# Patient Record
Sex: MALE | Race: WHITE | NOT HISPANIC OR LATINO | Employment: OTHER | URBAN - METROPOLITAN AREA
[De-identification: names, ages, dates, MRNs, and addresses within clinical notes are randomized per-mention and may not be internally consistent; named-entity substitution may affect disease eponyms.]

---

## 2017-01-25 ENCOUNTER — PATIENT OUTREACH (OUTPATIENT)
Dept: ADMINISTRATIVE | Facility: HOSPITAL | Age: 77
End: 2017-01-25

## 2017-02-02 ENCOUNTER — PATIENT OUTREACH (OUTPATIENT)
Dept: ADMINISTRATIVE | Facility: HOSPITAL | Age: 77
End: 2017-02-02

## 2017-02-02 NOTE — LETTER
February 2, 2017    Jet Hernandez  [address] [facility] [address]             [facility]  [address]  [place] [address]  Phone: [phone] Dear Mr. Hernandez:    We have tried to reach you by mychart unsuccessfully.   [facility] is committed to your overall health.  To help you get the most out of each of your visits, we will review your information to make sure you are up to date on all of your recommended tests and/or procedures.       Dr. Sampson        has found that you may be due for:     Tetanus immunization   Shingles immunization   Pneumonia immunization     Please bring your home blood pressure monitor so that we can test for its accuracy    If you have had any of the above done at another facility, please bring the records or information with you so that your record at [facility] will be complete.      If you are currently taking medication , please bring it with you to your appointment for review.    Sincerely,    Marisela Tariq  Clinical Care Coordinator  [facility]  [address] [place] [address]  Phone: [phone]   Fax: [phone]

## 2017-02-08 ENCOUNTER — OFFICE VISIT (OUTPATIENT)
Dept: FAMILY MEDICINE | Facility: CLINIC | Age: 77
End: 2017-02-08
Payer: MEDICARE

## 2017-02-08 VITALS
OXYGEN SATURATION: 98 % | HEART RATE: 58 BPM | SYSTOLIC BLOOD PRESSURE: 164 MMHG | HEIGHT: 76 IN | WEIGHT: 211.63 LBS | DIASTOLIC BLOOD PRESSURE: 74 MMHG | BODY MASS INDEX: 25.77 KG/M2

## 2017-02-08 DIAGNOSIS — M47.816 ARTHRITIS OF LUMBAR SPINE: ICD-10-CM

## 2017-02-08 DIAGNOSIS — J44.9 CHRONIC OBSTRUCTIVE PULMONARY DISEASE, UNSPECIFIED COPD TYPE: Chronic | ICD-10-CM

## 2017-02-08 DIAGNOSIS — E78.00 PURE HYPERCHOLESTEROLEMIA: ICD-10-CM

## 2017-02-08 DIAGNOSIS — I10 ESSENTIAL HYPERTENSION: Primary | ICD-10-CM

## 2017-02-08 DIAGNOSIS — I25.10 CORONARY ARTERY DISEASE INVOLVING NATIVE CORONARY ARTERY OF NATIVE HEART WITHOUT ANGINA PECTORIS: ICD-10-CM

## 2017-02-08 DIAGNOSIS — K21.9 GASTROESOPHAGEAL REFLUX DISEASE WITHOUT ESOPHAGITIS: Chronic | ICD-10-CM

## 2017-02-08 DIAGNOSIS — Z86.010 HISTORY OF COLON POLYPS: Chronic | ICD-10-CM

## 2017-02-08 PROBLEM — Z86.0100 HISTORY OF COLON POLYPS: Chronic | Status: ACTIVE | Noted: 2017-02-08

## 2017-02-08 PROCEDURE — 99214 OFFICE O/P EST MOD 30 MIN: CPT | Mod: S$PBB,,, | Performed by: INTERNAL MEDICINE

## 2017-02-08 PROCEDURE — 99999 PR PBB SHADOW E&M-EST. PATIENT-LVL III: CPT | Mod: PBBFAC,,, | Performed by: INTERNAL MEDICINE

## 2017-02-08 PROCEDURE — 99213 OFFICE O/P EST LOW 20 MIN: CPT | Mod: PBBFAC,PO | Performed by: INTERNAL MEDICINE

## 2017-02-08 RX ORDER — HYDROGEN PEROXIDE 3 %
20 SOLUTION, NON-ORAL MISCELLANEOUS
COMMUNITY
End: 2022-11-03

## 2017-02-08 NOTE — MR AVS SNAPSHOT
[facility]  [address]  [place] [address]  Phone: [phone]  Fax: [phone]                  Jet Hernandez   2017 11:00 AM   Office Visit    Description:  Male : 1940   Provider:  Stephan Sampson MD   Department:  [facility]           Reason for Visit     Annual Exam           Diagnoses this Visit        Comments    Essential hypertension    -  Primary     Chronic obstructive pulmonary disease, unspecified COPD type         Pure hypercholesterolemia         Arthritis of lumbar spine         Gastroesophageal reflux disease without esophagitis         Coronary artery disease involving native coronary artery of native heart without angina pectoris         History of colon polyps                To Do List           Future Appointments        Provider Department Dept Phone    8/15/2017 11:00 AM Stephan Sampson MD [facility] [phone]      Goals (5 Years of Data)     None      Follow-Up and Disposition     Return in about 6 months (around 2017).      [facility] On Call     [facility] [place] [facility] Line - 24/ Assistance  Registered nurses in the [facility] provide clinical advisement, health education, appointment booking, and other advisory services.  Call for this free service at [phone].             Medications           Message regarding Medications     Verify the changes and/or additions to your medication regime listed below are the same as discussed with your clinician today.  If any of these changes or additions are incorrect, please notify your healthcare provider.        STOP taking these medications     lansoprazole (PREVACID) 15 MG capsule Take 15 mg by mouth once daily.            Verify that the below list of medications is an accurate representation of the medications you are currently taking.  If none reported, the list may be blank. If incorrect, please contact your healthcare  "provider. Carry this list with you in case of emergency.           Current Medications     aspirin 81 mg Tab Take by mouth once daily.     carvedilol (COREG) 12.5 MG tablet Take 12.5 mg by mouth 2 (two) times daily.     esomeprazole (NEXIUM) 20 MG capsule Take 20 mg by mouth before breakfast.    fluticasone (FLONASE) 50 mcg/actuation nasal spray USE TWO SPRAYS IN EACH NARE ONCE DAILY    hydrochlorothiazide (MICROZIDE) 12.5 mg capsule Take 12.5 mg by mouth once daily.     ramipril (ALTACE) 5 MG capsule Take 5 mg by mouth 2 (two) times daily.     simvastatin (ZOCOR) 40 MG tablet Take 40 mg by mouth every evening.     albuterol 90 mcg/actuation inhaler Inhale 2 puffs into the lungs every 6 (six) hours as needed for Wheezing.           Clinical Reference Information           Your Vitals Were     BP Pulse Height Weight SpO2 BMI    164/74 58 6' 4" (1.93 m) 96 kg (211 lb 10.3 oz) 98% 25.76 kg/m2      Blood Pressure          Most Recent Value    BP  (!)  164/74      Allergies as of 2/8/2017     No Known Drug Allergies      Immunizations Administered on Date of Encounter - 2/8/2017     None      Instructions    Reviewed recent lab,discussed  risk factors, and recommended to continue his wellness program, exercises, diet, meds, and  wt. Control. See dictation..b.       Language Assistance Services     ATTENTION: Language assistance services are available, free of charge. Please call [phone].      ATENCIÓN: Si habla lee, tiene a pink disposición servicios gratuitos de asistencia lingüística. Llame al [phone].     [facility] Ý: N?u b?n nói Ti?ng Vi?t, có các d?ch v? h? tr? ngôn ng? mi?n phí dành cho b?n. G?i s? [phone].         [facility] complies with applicable Federal civil rights laws and does not discriminate on the basis of race, color, national origin, age, disability, or sex.        "

## 2017-02-08 NOTE — PATIENT INSTRUCTIONS
Reviewed recent lab,discussed  risk factors, and recommended to continue his wellness program, exercises, diet, meds, and  wt. Control. See dictation..b.

## 2017-02-08 NOTE — PROGRESS NOTES
A 76-year-old white male who presents again today for counseling, discussion of   his medical problems and also review of his medical problems.    To document the patient in the past and we will continue in the future to have   and address his primary care concerns completely with Cardiology and their nurse   practitioner by choice.  However, today, would like to review his medical   problems and condition, especially his low back syndrome, which has been noted   on the past 2-3 months symptomatic.      He has had rather significant low back pain   history with surgery in the remote past, but no significant problems, but has   been overworked, stressed and possibly strained his back and has somewhat   persistent low back pain with slight radiculopathy noted on the right.  He has   on his own found with evaluation from both orthopedic and neurosurgical   evaluations by Dr. Ramires and Dr. Roberth Li, have rather significant low   back syndrome strain, possibly early radiculopathy and stenosis.  He has   underwent a steroid injection approximately 2-3 weeks ago with definite   significant relief since that time and even improvement in radiculopathy.  He is   extremely concerned regarding avoidance of future surgery and asked questions   regarding his condition.      We discussed his medical problems and condition at   present.  His pain, discomfort, limitation, fall risk and all appeared to be   relatively stable except for stiffness and pain and somewhat insecure regarding   his mobility.  Precautions given, if any doubt, use a cane or walker and   continue limited exercise and home PT and heat and possibly consider and   continue his physical therapy, which he entered by their recommendations and   consultation.  His course would be important over the next 3-6 months.  If he   has consistent pain, discomfort and limitation, particularly fall risk after his   injection, possibly a repeat injection may be  indicated, but if persistent   areas of symptoms above, would definitely consider a reconsultation with Dr. Roberth Li, who is very good and very conservative.      He has had   hyperlipidemia in the past and off and on lipid-lowering agents and diet, and   all lab again will be performed at [facility].  I asked for a   possible copy to review and have here at [facility].    Hypertension, essential, labile.  Past history of white coat syndrome.  Today on   arrival, 164/74, repeat in the left arm standing is 142/80.  Precautions to   continue to self monitor and maintain under 135 systolic and under 80 diastolic   mostly on a consistent basis.  No associated symptoms, etc.    He has been seen and evaluated by Dr. Jason Lott in the past and has had   coronary artery disease by history, but no unstable angina, CHF or arrhythmia   noted and now [facility] will continue with followup and evaluation as   indicated.    The patient has had a history of colonic polyps, possibly by history   nonmalignant or dysplastic, but now with Dr. Victor retired and no notification   given, possibly due at the present time would call their nurse for instructions   and recommendations.  No GI symptoms noted.  Appetite is good.  BMs are normal.    No melena, hematochezia, dysphagia or dyspepsia.    REVIEW OF SYSTEMS:  GENERAL:  Complete review of systems otherwise unremarkable with no evidence of   visual or auditory changes.  Recent eye examinations have been essentially   within normal range.  No fever, chills or sweats.  RESPIRATORY:  No wheeze, cough or shortness of breath.  HEART:  See HPI.  GASTROINTESTINAL:  Appetite good.  No diarrhea.  GENITOURINARY:  The patient has no dysuria or hematuria, but has had an elevated   PSA and the last 4.76 and no significant change or improvement with treatment   with Cipro.  Dr. Saucedo, his urologist will be following, repeating PSA in 6   months and reevaluating his  condition for possible CA.  I concurred with this   program and recommendation.    The patient's immunization reviewed, all up-to-date according to history.  He   has had his Tdap, 2 pneumonia vaccines, a flu shot recent and shingles vaccine   in the past.    PHYSICAL EXAMINATION:  GENERAL:  Reveals a well-developed, well-nourished white male.  VITAL SIGNS:  See vital signs and measurements.  VEINS:  Good peripheral pulsation.  Normal carotid upstroke and amplitude.    There was no edema or phlebitis noted.  NODES:  None felt in the neck, submandibular or supraclavicular region.  NEUROLOGICAL:  Oriented x3.  Relatively good motor tone and strength.  Cranial   nerves grossly intact II-XII.  No tremor noted and no evidence of gait   abnormality or fall risk with brief observation.  HEENT:  Pupils are equal and reactive to light and accommodation.  External eye   movements were intact.  No pharyngitis, otitis or rhinitis.  NECK:  No masses or thyroid.  LUNGS:  Clear.  Distant tones.  HEART:  Regular rhythm, 68 beats per minute.  No gallop or arrhythmia.  ABDOMEN:  Soft.  No localized pain, tenderness.  No organomegaly or masses felt   at this time.  Bowel tones present.  EXTREMITIES:  No edema, phlebitis or acute arthritis noted.  WHITNEY:  Per GI and Urology.    IMPRESSION:  At this time:  1.  Essential hypertension, probably controlled, associated white coat type   syndrome and will continue to self monitor and maintain within the normal   parameters outlined and discussed.  2.  Past history of COPD, stable.  No evidence of the cough, congestion,   pleurisy or wheezing.  3.  Hypercholesterolemia, on lipid-lowering agents and diet, will continue and   follow up with routine required monitoring.  4.  GERD, stable.  No dysphagia or dyspepsia, off PPIs now on recommendations   because of potential long-term side effects.  No active symptoms subsequently.  5.  Coronary artery disease, stable.  Continue to follow up with   [facility].  6.  History of colonic polyps discussed above and would definitely call for   recommendations on the next surveillance procedure.    The patient advised to continue his followup, return in the next year or 2 years   as needed and call or contact if any febrile infections are noted during the   interim.      MAGED/AHMET  dd: 02/08/2017 12:07:05 (CST)  td: 02/08/2017 13:32:17 (CST)  Doc ID   #[id number]  Job ID #[id number]    CC:

## 2017-08-16 ENCOUNTER — OFFICE VISIT (OUTPATIENT)
Dept: FAMILY MEDICINE | Facility: CLINIC | Age: 77
End: 2017-08-16
Payer: MEDICARE

## 2017-08-16 VITALS
BODY MASS INDEX: 25.61 KG/M2 | DIASTOLIC BLOOD PRESSURE: 80 MMHG | SYSTOLIC BLOOD PRESSURE: 155 MMHG | WEIGHT: 210.31 LBS | HEART RATE: 60 BPM | HEIGHT: 76 IN | RESPIRATION RATE: 14 BRPM | TEMPERATURE: 98 F

## 2017-08-16 DIAGNOSIS — Z12.5 SCREENING FOR PROSTATE CANCER: ICD-10-CM

## 2017-08-16 DIAGNOSIS — J44.9 CHRONIC OBSTRUCTIVE PULMONARY DISEASE, UNSPECIFIED COPD TYPE: Chronic | ICD-10-CM

## 2017-08-16 DIAGNOSIS — E78.00 PURE HYPERCHOLESTEROLEMIA: ICD-10-CM

## 2017-08-16 DIAGNOSIS — K21.9 GASTROESOPHAGEAL REFLUX DISEASE WITHOUT ESOPHAGITIS: Chronic | ICD-10-CM

## 2017-08-16 DIAGNOSIS — R53.83 FATIGUE, UNSPECIFIED TYPE: ICD-10-CM

## 2017-08-16 DIAGNOSIS — I25.10 CORONARY ARTERY DISEASE INVOLVING NATIVE CORONARY ARTERY OF NATIVE HEART WITHOUT ANGINA PECTORIS: ICD-10-CM

## 2017-08-16 DIAGNOSIS — I10 ESSENTIAL HYPERTENSION: Primary | ICD-10-CM

## 2017-08-16 DIAGNOSIS — Z86.010 HISTORY OF COLON POLYPS: Chronic | ICD-10-CM

## 2017-08-16 DIAGNOSIS — R97.20 ELEVATED PSA: Chronic | ICD-10-CM

## 2017-08-16 PROCEDURE — 99214 OFFICE O/P EST MOD 30 MIN: CPT | Mod: S$PBB,,, | Performed by: INTERNAL MEDICINE

## 2017-08-16 PROCEDURE — 3077F SYST BP >= 140 MM HG: CPT | Mod: ,,, | Performed by: INTERNAL MEDICINE

## 2017-08-16 PROCEDURE — 99214 OFFICE O/P EST MOD 30 MIN: CPT | Mod: PBBFAC,PO | Performed by: INTERNAL MEDICINE

## 2017-08-16 PROCEDURE — 1126F AMNT PAIN NOTED NONE PRSNT: CPT | Mod: ,,, | Performed by: INTERNAL MEDICINE

## 2017-08-16 PROCEDURE — 3079F DIAST BP 80-89 MM HG: CPT | Mod: ,,, | Performed by: INTERNAL MEDICINE

## 2017-08-16 PROCEDURE — 99999 PR PBB SHADOW E&M-EST. PATIENT-LVL IV: CPT | Mod: PBBFAC,,, | Performed by: INTERNAL MEDICINE

## 2017-08-16 PROCEDURE — 1159F MED LIST DOCD IN RCRD: CPT | Mod: ,,, | Performed by: INTERNAL MEDICINE

## 2017-08-16 RX ORDER — GLUCOSAMINE/CHONDRO SU A 500-400 MG
1 TABLET ORAL 3 TIMES DAILY
COMMUNITY
End: 2023-06-20

## 2017-08-16 NOTE — PROGRESS NOTES
A 77-year-old white male presents today for comprehensive primary care   evaluation and recommendations.    The patient seemed to be doing well and is stable at the present time, but has   had a few problems with other areas of consult.    He has been seen and followed by urologist too in the area outside of [facility],   Dr. Saucedo for slightly elevated PSA in the 4s and 5s.  He has had questionable   evidence of prostatitis clinically and treated in the past, which may be   responsible also.  No biopsy taken at the present time, but will be followed and   reevaluated and biopsied if indicated according to history.  No upper or lower   urinary tract symptoms associated renal colic or severe nocturia, only 0 to 1 at   night.    The patient has evidence of GERD, chronic and is dependent and consistently   dependent on his PPI as he has attempted to taper and discontinue without   success on many occasions.    The patient also has a history of chronic reflux and is followed closely and   consistently with his problem.  No upper or lower GI symptoms at the present   time.  Again, no dysphagia or dyspepsia with his treatment or melena,   hematochezia or abdominal pain noted.    The patient has a past history of asthma or COPD.  There have been no active   symptoms of wheeze, cough, shortness breath or productive cough or pleurisy   noted on careful review.    Degenerative arthritis of the lumbar spine, slight stiffness noted and sometimes   some radiculopathy noted on the right.  He is followed by  a   neurosurgeon outside the clinic and considered the possibility of minimally   invasive lumbar surgery.    Again, the patient has significant labile hypertension associated with white   coat and does take low-dose medication at the present time, but on arrival today   was 150/74, repeat in the left arm standing is 155/80.  A lengthy discussion   given regarding his need for self-monitoring and also to bring in his own cuff    for accuracy and comparison over now and the future.  Strict adherence to   guidelines if his systolic is above 140 to 145 on a consistent basis and his   diastolic greater than 80, possible further increase in his antihypertensive   medication may be indicated.  No associated headache, dizziness or visual   changes noted.    The patient has coronary artery disease, under the care of Dr. Jorge Lindo and   has no evidence of unstable angina, CHF or edema.  He will be consulted in the   next month or two months by review.    Hyperlipidemia, relatively stable, is on Zocor 40 mg p.o. daily for his diet and   weight control.  Exercises on a regular basis and continues his wellness   program.  All monitoring of his laboratory work, comprehensive type is done by   Dr. Lindo, his group and possibly his nurse practitioner.  Precautions to   continue and maintain and send a copy for review and scanning into a computer.    COMPLETE REVIEW OF SYSTEMS:  Otherwise unremarkable.  HEENT:  There has been no evidence of visual or hearing changes.  He has hearing   aids, but uses it infrequently according to history.  No visual changes and is   obtaining his eye exam on a yearly bases.  RESPIRATORY:  See HPI.  HEART:  See HPI.  GASTROINTESTINAL:  Appetite good.  BMs are normal and no dysphagia or dyspepsia   noted at present.  We will continue with surveillance as recommended by his   gastroenterologist outside of the clinic.  GENITOURINARY:  No significant dysuria or hematuria.  MUSCULOSKELETAL:  No pain, flare, redness, swelling of the joints.  NEUROLOGIC:  No mood or depressed state or movement type abnormality or syncope   by history.    Immunizations are reviewed.  No records here, but Dr. Lindo's office apparently   on careful review, has his yearly flu shot, both pneumonia vaccines up-to-date   and Tdap and shingles vaccine.    PHYSICAL EXAMINATION:  GENERAL:  Reveals a well-developed, well-nourished white male.  VITAL  SIGNS:  See vital signs and measurements.  ARTERIES AND VEINS:  Good peripheral pulsation.  Normal carotid upstroke and   amplitude.  There was no edema or phlebitis noted.  SKIN:  Warm, dry.  No lesions or exanthems found.  NODES:  None felt in and around the neck, submandibular or supraclavicular   region.  NEUROLOGICAL:  Oriented x3.  Relatively good motor tone and strength.  Cranial   nerves grossly intact II-XII.  Gait normal.  HEENT:  Pupils are equal and reactive to light and accommodation.  External eye   movements are intact.  No otitis, rhinitis or pharyngitis.  NECK:  No masses or thyroid.  LUNGS:  Clear.  No wheezes or rales, but slightly distant tones.  HEART:  Regular rhythm.  P2 equals A2.  No S3 or arrhythmia or cardiomegaly by   percussion.  ABDOMEN:  Soft.  No localized pain.  No organs or masses felt at this time.    Bowel tones are present.  EXTREMITIES:  No edema or acute arthritis or phlebitis.  RECTAL:  WHTINEY per Urology.    IMPRESSION:  At this time:  1.  Elevated PSA.  See above discussion and recommendations.  Continue followup   with Urology.  No associated  symptoms.  2.  GERD, stable, dependent upon his PPI and will continue on a regular basis.  3.  History of colonic polyps and no definite history of significant histopath   results, but continue followup as instructed.  4.  COPD in the past, but no active symptoms or recurrent symptoms of infection   noted.  Precautions, however, give and reminder regarding his flu shot in the   next month or two months.  5.  Degenerative arthritis of the lumbar spine, symptomatic and is having some   definite limitation and pain and discomfort and occasional radiculopathy   indicating a followup and possibly consideration of surgery in the near future.  6.  Hypertension, essential, with significant labile and white coat type   manifestations.  See above discussion and recommendations to continue to monitor   as frequently as he may need further  antihypertensive medications.  7.  Coronary artery disease, under the care of masha Banks.  Continue   followup as instructed.  8.  Hyperlipidemia, apparently controlled and responsive to diet and   lipid-lowering agents and would continue on a regular basis.    The patient was counseled regarding his condition as he is doing well and has no   further complications or problems.  Return visit in the next six to nine months   or as needed.  Precautions given.      MAGED/AHMET  dd: 08/16/2017 12:13:34 (CDT)  td: 08/16/2017 19:07:23 (CDT)  Doc ID   #[id number]  Job ID #[id number]    CC:    This office note has been dictated.

## 2017-08-16 NOTE — PATIENT INSTRUCTIONS
Reviewed recent lab,discussed  risk factors, and recommended to continue his wellness program, exercises, diet, meds, and  wt. Control. See dictation.[email or web address]

## 2017-09-25 LAB
CHOLEST SERPL-MSCNC: 124 MG/DL (ref 0–200)
CHOLESTEROL/HDL RATIO SCREEN: 2.6
HDLC SERPL-MCNC: 46 MG/DL
LDLC SERPL CALC-MCNC: 52 MG/DL
TRIGL SERPL-MCNC: 130 MG/DL

## 2017-10-25 ENCOUNTER — OFFICE VISIT (OUTPATIENT)
Dept: OTOLARYNGOLOGY | Facility: CLINIC | Age: 77
End: 2017-10-25
Payer: MEDICARE

## 2017-10-25 VITALS
BODY MASS INDEX: 25.93 KG/M2 | DIASTOLIC BLOOD PRESSURE: 71 MMHG | SYSTOLIC BLOOD PRESSURE: 190 MMHG | HEART RATE: 64 BPM | HEIGHT: 76 IN | WEIGHT: 212.94 LBS

## 2017-10-25 DIAGNOSIS — H91.93 BILATERAL HEARING LOSS, UNSPECIFIED HEARING LOSS TYPE: ICD-10-CM

## 2017-10-25 DIAGNOSIS — H61.23 BILATERAL IMPACTED CERUMEN: Primary | ICD-10-CM

## 2017-10-25 DIAGNOSIS — Z97.4 WEARS HEARING AID: ICD-10-CM

## 2017-10-25 PROCEDURE — 69210 REMOVE IMPACTED EAR WAX UNI: CPT | Mod: PBBFAC,PO | Performed by: NURSE PRACTITIONER

## 2017-10-25 PROCEDURE — 99999 PR PBB SHADOW E&M-EST. PATIENT-LVL III: CPT | Mod: PBBFAC,,, | Performed by: NURSE PRACTITIONER

## 2017-10-25 PROCEDURE — 99499 UNLISTED E&M SERVICE: CPT | Mod: S$PBB,,, | Performed by: NURSE PRACTITIONER

## 2017-10-25 PROCEDURE — 99213 OFFICE O/P EST LOW 20 MIN: CPT | Mod: PBBFAC,PO | Performed by: NURSE PRACTITIONER

## 2017-10-25 PROCEDURE — 69210 REMOVE IMPACTED EAR WAX UNI: CPT | Mod: S$PBB,,, | Performed by: NURSE PRACTITIONER

## 2017-10-25 NOTE — PROGRESS NOTES
Subjective:       Patient ID: Jet Hernandez is a 77 y.o. male.    Chief Complaint: Cerumen Impaction    HPI  Patient last seen 5 years ago for ETD. He returns today at the request of Dr. Jennings at [facility] in [place]. He reports aural blockage AD affecting his hearing even when hearing aid is in place. He denies otalgia or otorrhea. He denies any other ENT symptoms or concerns at present.     Review of Systems   Constitutional: Negative.  Negative for fatigue and fever.   HENT: Positive for hearing loss. Negative for ear discharge.    Eyes: Negative.    Respiratory: Negative.  Negative for cough, shortness of breath and wheezing.    Cardiovascular: Negative.    Gastrointestinal: Negative.    Musculoskeletal: Negative.  Negative for neck pain.   Skin: Negative.  Negative for pallor and rash.   Neurological: Negative.    Psychiatric/Behavioral: Negative.        Objective:      Physical Exam   Constitutional: He is oriented to person, place, and time. Vital signs are normal. He appears well-developed and well-nourished. He is cooperative. He does not appear ill. No distress.   HENT:   Head: Normocephalic and atraumatic.   Right Ear: Tympanic membrane, external ear and ear canal normal. No drainage. Tympanic membrane is not erythematous. No middle ear effusion.   Left Ear: Tympanic membrane, external ear and ear canal normal. No drainage. Tympanic membrane is not erythematous.  No middle ear effusion.   Nose: Mucosal edema present. No rhinorrhea or septal deviation. Right sinus exhibits no maxillary sinus tenderness and no frontal sinus tenderness. Left sinus exhibits no maxillary sinus tenderness and no frontal sinus tenderness.   Mouth/Throat: Uvula is midline, oropharynx is clear and moist and mucous membranes are normal. Mucous membranes are not pale, not dry and not cyanotic. No oral lesions. No posterior oropharyngeal edema or posterior oropharyngeal erythema.     SEPARATE PROCEDURE IN OFFICE:    Procedure: Removal of impacted cerumen, bilateral   Pre Procedure Diagnosis: Cerumen Impaction   Post Procedure Diagnosis: Cerumen Impaction   Verbal informed consent in regards to risk of trauma to ear canal, ear drum or hearing, discomfort during procedure and/or inability to remove cerumen impaction in one session or unforeseen events or complications.   No anesthesia.     Procedure in detail:   Ear canal visualized bilateral with appropriate size ear speculum utilizing Operating Head Binocular Otomicroscope   Utilizing the following: Ring curet AU. The impacted cerumen of the ear canals was removed atraumatically. The TM and EAC were then inspected and found to be clear of wax. See description of TMs/EACs in PE above.   Complications: No   Condition: Improved/Good       Eyes: Conjunctivae are normal. Pupils are equal, round, and reactive to light. Right eye exhibits no discharge. Left eye exhibits no discharge. No scleral icterus.   Neck: Trachea normal and normal range of motion. Neck supple. No tracheal deviation present. No thyroid mass and no thyromegaly present.   Pulmonary/Chest: Effort normal. No respiratory distress. He has no wheezes.   Musculoskeletal: Normal range of motion.   Lymphadenopathy:        Head (right side): No submental, no preauricular and no posterior auricular adenopathy present.        Head (left side): No submental, no preauricular and no posterior auricular adenopathy present.     He has no cervical adenopathy.   Neurological: He is alert and oriented to person, place, and time.   Skin: Skin is warm, dry and intact. No lesion and no rash noted. He is not diaphoretic. No erythema. No pallor.   Psychiatric: He has a normal mood and affect. His speech is normal and behavior is normal. Judgment and thought content normal. Cognition and memory are normal.   Nursing note and vitals reviewed.      Assessment:     Cerumen impactions removed    Hearing loss, wears hearing aids AU  Plan:      Patient stated he would like to check with his insurance company to see if he can come more than once a year to have his ears cleaned as it helps significantly with his hearing.      Return as needed for further ENT concerns

## 2018-03-07 ENCOUNTER — OFFICE VISIT (OUTPATIENT)
Dept: FAMILY MEDICINE | Facility: CLINIC | Age: 78
End: 2018-03-07
Payer: MEDICARE

## 2018-03-07 VITALS
BODY MASS INDEX: 25.93 KG/M2 | HEART RATE: 63 BPM | HEIGHT: 76 IN | RESPIRATION RATE: 18 BRPM | OXYGEN SATURATION: 97 % | DIASTOLIC BLOOD PRESSURE: 80 MMHG | WEIGHT: 212.94 LBS | SYSTOLIC BLOOD PRESSURE: 178 MMHG

## 2018-03-07 DIAGNOSIS — L89.91: Primary | ICD-10-CM

## 2018-03-07 PROCEDURE — 99999 PR PBB SHADOW E&M-EST. PATIENT-LVL III: CPT | Mod: PBBFAC,,, | Performed by: FAMILY MEDICINE

## 2018-03-07 PROCEDURE — 99213 OFFICE O/P EST LOW 20 MIN: CPT | Mod: S$PBB,,, | Performed by: FAMILY MEDICINE

## 2018-03-07 PROCEDURE — 99213 OFFICE O/P EST LOW 20 MIN: CPT | Mod: PBBFAC,PO | Performed by: FAMILY MEDICINE

## 2018-03-07 NOTE — PROGRESS NOTES
Subjective:       Patient ID: Jet Hernandez is a 77 y.o. male.    Chief Complaint: Possible Shingles    3 weeks ago he noticed rahs on the bilateral buttucks and both forearms.  He does note that these areas are places where his skin contact when he sits in his recliner for hours at end.        Past Medical History:   Diagnosis Date    Allergy     Coronary artery disease     stent x 1    GERD (gastroesophageal reflux disease)     Hyperlipidemia     Hypertension     Otitis media        Past Surgical History:   Procedure Laterality Date    CARPAL TUNNEL RELEASE  February 2012    right wrist    CHOLECYSTECTOMY  11/2007    CORONARY ANGIOPLASTY  August 2000    circumflex    CORONARY STENT PLACEMENT  July 2000    EYE SURGERY  November 2012    cataract right eye    HERNIA REPAIR      right inguinal    SPINE SURGERY  May 2005    spinal laminectomy and fusion       Review of patient's allergies indicates:   Allergen Reactions    No known drug allergies        Social History     Social History    Marital status:      Spouse name: N/A    Number of children: N/A    Years of education: N/A     Occupational History    Not on file.     Social History Main Topics    Smoking status: Former Smoker     Packs/day: 0.50     Years: 10.00    Smokeless tobacco: Never Used    Alcohol use Yes      Comment: 4 times per week    Drug use: No    Sexual activity: Not on file     Other Topics Concern    Not on file     Social History Narrative    No narrative on file       Current Outpatient Prescriptions on File Prior to Visit   Medication Sig Dispense Refill    aspirin 81 mg Tab Take by mouth once daily.       carvedilol (COREG) 12.5 MG tablet Take 12.5 mg by mouth 2 (two) times daily.       esomeprazole (NEXIUM) 20 MG capsule Take 20 mg by mouth before breakfast.      fluticasone (FLONASE) 50 mcg/actuation nasal spray USE TWO SPRAYS IN EACH NARE ONCE DAILY 48 g 3    glucosamine-chondroitin 500-400 mg  "tablet Take 1 tablet by mouth 3 (three) times daily.      hydrochlorothiazide (MICROZIDE) 12.5 mg capsule Take 12.5 mg by mouth once daily.       ramipril (ALTACE) 5 MG capsule Take 5 mg by mouth 2 (two) times daily.       simvastatin (ZOCOR) 40 MG tablet Take 40 mg by mouth every evening.        No current facility-administered medications on file prior to visit.        No family history on file.    Review of Systems   Constitutional: Negative for appetite change, chills, fever and unexpected weight change.   HENT: Negative for sore throat and trouble swallowing.    Eyes: Negative for pain and visual disturbance.   Respiratory: Negative for cough, chest tightness, shortness of breath and wheezing.    Cardiovascular: Negative for chest pain, palpitations and leg swelling.   Gastrointestinal: Negative for abdominal pain, blood in stool, constipation, diarrhea and nausea.   Genitourinary: Negative for difficulty urinating, dysuria and hematuria.   Musculoskeletal: Positive for back pain. Negative for arthralgias, gait problem and neck pain.   Skin: Positive for wound. Negative for rash.   Neurological: Negative for dizziness, weakness, light-headedness and headaches.   Hematological: Negative for adenopathy.   Psychiatric/Behavioral: Negative for dysphoric mood.       Objective:      BP (!) 178/80   Pulse 63   Resp 18   Ht 6' 4" (1.93 m)   Wt 96.6 kg (212 lb 15.4 oz)   SpO2 97%   BMI 25.92 kg/m²   Physical Exam   Constitutional: He is oriented to person, place, and time. He appears well-developed and well-nourished. He is active. No distress.   HENT:   Head: Normocephalic and atraumatic.   Right Ear: External ear normal.   Left Ear: External ear normal.   Mouth/Throat: Uvula is midline, oropharynx is clear and moist and mucous membranes are normal. No oropharyngeal exudate.   Eyes: Conjunctivae, EOM and lids are normal. Pupils are equal, round, and reactive to light.   Neck: Normal range of motion, full " passive range of motion without pain and phonation normal. Neck supple. No thyroid mass and no thyromegaly present.   Cardiovascular: Normal rate, regular rhythm, normal heart sounds and intact distal pulses.  Exam reveals no gallop and no friction rub.    No murmur heard.  Pulmonary/Chest: Effort normal and breath sounds normal. No respiratory distress. He has no wheezes. He has no rales.   Musculoskeletal: Normal range of motion.   Lymphadenopathy:     He has no cervical adenopathy.   Neurological: He is alert and oriented to person, place, and time. No cranial nerve deficit. Coordination normal.   Skin: Skin is warm and dry.        Areas are concern are stage 1 pressure sores   Psychiatric: He has a normal mood and affect. His speech is normal and behavior is normal. Judgment and thought content normal.       Assessment:       1. Pressure ulcer, stage 1, unspecified location        Plan:       Pressure ulcer, stage 1, unspecified location      zinc oxide BID to affected areas  Position changes when sitting every 30 minutes  Add padding to easy chair  F/u PRN

## 2018-04-02 LAB
CHOLEST SERPL-MSCNC: 127 MG/DL (ref 0–200)
CHOLESTEROL/HDL RATIO SCREEN: 2.8
HDLC SERPL-MCNC: 44 MG/DL
LDLC SERPL CALC-MCNC: 52 MG/DL
TRIGL SERPL-MCNC: 155 MG/DL

## 2018-09-24 ENCOUNTER — OFFICE VISIT (OUTPATIENT)
Dept: OTOLARYNGOLOGY | Facility: CLINIC | Age: 78
End: 2018-09-24
Payer: MEDICARE

## 2018-09-24 VITALS
BODY MASS INDEX: 25.45 KG/M2 | HEART RATE: 59 BPM | DIASTOLIC BLOOD PRESSURE: 84 MMHG | SYSTOLIC BLOOD PRESSURE: 181 MMHG | WEIGHT: 209 LBS | HEIGHT: 76 IN

## 2018-09-24 DIAGNOSIS — H91.93 BILATERAL HEARING LOSS, UNSPECIFIED HEARING LOSS TYPE: ICD-10-CM

## 2018-09-24 DIAGNOSIS — H61.23 BILATERAL IMPACTED CERUMEN: ICD-10-CM

## 2018-09-24 DIAGNOSIS — Z97.4 WEARS HEARING AID IN BOTH EARS: Primary | ICD-10-CM

## 2018-09-24 PROCEDURE — 99999 PR PBB SHADOW E&M-EST. PATIENT-LVL III: CPT | Mod: PBBFAC,,, | Performed by: NURSE PRACTITIONER

## 2018-09-24 PROCEDURE — 99213 OFFICE O/P EST LOW 20 MIN: CPT | Mod: PBBFAC,PO,25 | Performed by: NURSE PRACTITIONER

## 2018-09-24 PROCEDURE — 69210 REMOVE IMPACTED EAR WAX UNI: CPT | Mod: 50,PBBFAC,PO | Performed by: NURSE PRACTITIONER

## 2018-09-24 PROCEDURE — 69210 REMOVE IMPACTED EAR WAX UNI: CPT | Mod: S$PBB,,, | Performed by: NURSE PRACTITIONER

## 2018-09-24 PROCEDURE — 99499 UNLISTED E&M SERVICE: CPT | Mod: S$PBB,,, | Performed by: NURSE PRACTITIONER

## 2018-09-24 NOTE — PROGRESS NOTES
Subjective:       Patient ID: Jet Hernandez is a 78 y.o. male.    Chief Complaint: Cerumen Impaction    HPI  Patient last seen 11 months ago for cerumen impaction and ETD management. He sees Dr. Jennings at [facility] in [place]. He reports aural blockage, and seeing lots of cerumen on his hearing aid when he takes them out. He denies otalgia or otorrhea. He denies any other ENT symptoms or concerns at present.     Review of Systems   Constitutional: Negative.  Negative for fatigue and fever.   HENT: Positive for hearing loss (wears hearing aids AU). Negative for ear discharge.    Eyes: Negative.    Respiratory: Negative.  Negative for cough, shortness of breath and wheezing.    Cardiovascular: Negative.    Gastrointestinal: Negative.    Musculoskeletal: Negative.  Negative for neck pain.   Skin: Negative.  Negative for pallor and rash.   Neurological: Negative.    Psychiatric/Behavioral: Negative.        Objective:      Physical Exam   Constitutional: He is oriented to person, place, and time. Vital signs are normal. He appears well-developed and well-nourished. He is cooperative. He does not appear ill. No distress.   HENT:   Head: Normocephalic and atraumatic.   Right Ear: Tympanic membrane, external ear and ear canal normal. No drainage. Tympanic membrane is not erythematous. No middle ear effusion.   Left Ear: Tympanic membrane, external ear and ear canal normal. No drainage. Tympanic membrane is not erythematous.  No middle ear effusion.   Nose: Mucosal edema present. No rhinorrhea or septal deviation.   Mouth/Throat: Uvula is midline, oropharynx is clear and moist and mucous membranes are normal. Mucous membranes are not pale, not dry and not cyanotic. No oral lesions. No posterior oropharyngeal edema or posterior oropharyngeal erythema.     SEPARATE PROCEDURE IN OFFICE:   Procedure: Removal of impacted cerumen, bilateral   Pre Procedure Diagnosis: Cerumen Impaction   Post Procedure Diagnosis:  Cerumen Impaction   Verbal informed consent in regards to risk of trauma to ear canal, ear drum or hearing, discomfort during procedure and/or inability to remove cerumen impaction in one session or unforeseen events or complications.   No anesthesia.     Procedure in detail:   Ear canal visualized bilateral with appropriate size ear speculum utilizing Operating Head Binocular Otomicroscope   Utilizing the following: Suction cannula used AU. The impacted cerumen of the ear canals was removed atraumatically. The TM and EAC were then inspected and found to be clear of wax. See description of TMs/EACs in PE above.   Complications: No   Condition: Improved/Good       Eyes: Conjunctivae are normal. Pupils are equal, round, and reactive to light. Right eye exhibits no discharge. Left eye exhibits no discharge. No scleral icterus.   Neck: Trachea normal and normal range of motion. Neck supple. No tracheal deviation present. No thyroid mass and no thyromegaly present.   Pulmonary/Chest: Effort normal. No respiratory distress. He has no wheezes.   Musculoskeletal: Normal range of motion.   Lymphadenopathy:        Head (right side): No submental, no preauricular and no posterior auricular adenopathy present.        Head (left side): No submental, no preauricular and no posterior auricular adenopathy present.     He has no cervical adenopathy.   Neurological: He is alert and oriented to person, place, and time.   Skin: Skin is warm, dry and intact. No lesion and no rash noted. He is not diaphoretic. No erythema. No pallor.   Psychiatric: He has a normal mood and affect. His speech is normal and behavior is normal. Judgment and thought content normal. Cognition and memory are normal.   Nursing note and vitals reviewed.      Assessment:     Cerumen impactions removed    Hearing loss, wears hearing aids AU  Plan:     Patient states he would like to have his ears cleaned every 7-8 months.      Return as needed for further ENT  concerns

## 2018-10-08 LAB
CHOLEST SERPL-MSCNC: 115 MG/DL (ref 0–200)
CHOLESTEROL/HDL RATIO SCREEN: 3
HDLC SERPL-MCNC: 38 MG/DL
LDLC SERPL CALC-MCNC: 40 MG/DL
TRIGL SERPL-MCNC: 185 MG/DL

## 2018-12-17 ENCOUNTER — OFFICE VISIT (OUTPATIENT)
Dept: FAMILY MEDICINE | Facility: CLINIC | Age: 78
End: 2018-12-17
Payer: MEDICARE

## 2018-12-17 VITALS
HEART RATE: 56 BPM | SYSTOLIC BLOOD PRESSURE: 132 MMHG | DIASTOLIC BLOOD PRESSURE: 80 MMHG | RESPIRATION RATE: 16 BRPM | WEIGHT: 209.69 LBS | HEIGHT: 76 IN | BODY MASS INDEX: 25.53 KG/M2

## 2018-12-17 DIAGNOSIS — J44.9 CHRONIC OBSTRUCTIVE PULMONARY DISEASE, UNSPECIFIED COPD TYPE: Chronic | ICD-10-CM

## 2018-12-17 DIAGNOSIS — E78.00 PURE HYPERCHOLESTEROLEMIA: ICD-10-CM

## 2018-12-17 DIAGNOSIS — I10 ESSENTIAL HYPERTENSION: Primary | ICD-10-CM

## 2018-12-17 DIAGNOSIS — M47.816 ARTHRITIS OF LUMBAR SPINE: ICD-10-CM

## 2018-12-17 PROCEDURE — 99999 PR PBB SHADOW E&M-EST. PATIENT-LVL III: CPT | Mod: PBBFAC,,, | Performed by: FAMILY MEDICINE

## 2018-12-17 PROCEDURE — 99214 OFFICE O/P EST MOD 30 MIN: CPT | Mod: S$PBB,,, | Performed by: FAMILY MEDICINE

## 2018-12-17 PROCEDURE — 99213 OFFICE O/P EST LOW 20 MIN: CPT | Mod: PBBFAC,PO | Performed by: FAMILY MEDICINE

## 2018-12-17 NOTE — PROGRESS NOTES
Subjective:       Patient ID: Jet Hernandez is a 78 y.o. male.    Chief Complaint: Establish Care (Patient here to establish care)    Here as new patient to establish care; previously saw Dr. Sampson.  Doing well overall. Follows with Dr. Lindo for cardiology and brings in recent labs.      Hypertension   This is a chronic problem. The current episode started more than 1 year ago. The problem is controlled. Pertinent negatives include no chest pain, palpitations or shortness of breath.   Hyperlipidemia   This is a chronic problem. The current episode started more than 1 year ago. The problem is controlled. Pertinent negatives include no chest pain or shortness of breath.     Review of Systems   Constitutional: Negative for chills, fatigue and fever.   Respiratory: Negative for cough, chest tightness and shortness of breath.    Cardiovascular: Negative for chest pain, palpitations and leg swelling.   Endocrine: Negative for cold intolerance and heat intolerance.   Skin: Negative for rash.   Psychiatric/Behavioral: Negative for dysphoric mood. The patient is not nervous/anxious.        Objective:      Physical Exam   Constitutional: He appears well-developed and well-nourished.   HENT:   Head: Normocephalic and atraumatic.   Cardiovascular: Normal rate, regular rhythm and normal heart sounds.   Pulmonary/Chest: Effort normal and breath sounds normal.   Psychiatric: He has a normal mood and affect.   Nursing note and vitals reviewed.      Assessment:       1. Essential hypertension    2. Pure hypercholesterolemia    3. Arthritis of lumbar spine    4. Chronic obstructive pulmonary disease, unspecified COPD type        Plan:       Essential hypertension    Pure hypercholesterolemia    Arthritis of lumbar spine    Chronic obstructive pulmonary disease, unspecified COPD type      Continue monitoring psa with urologist.  Scan labs to chart.  Will monitor chronic medical issues and continue current plan of  care.  sophie gave pneumonia vaccines and to bring print out    Follow-up in about 6 months (around 6/17/2019), or if symptoms worsen or fail to improve.

## 2018-12-18 ENCOUNTER — TELEPHONE (OUTPATIENT)
Dept: ADMINISTRATIVE | Facility: HOSPITAL | Age: 78
End: 2018-12-18

## 2018-12-19 ENCOUNTER — TELEPHONE (OUTPATIENT)
Dept: ADMINISTRATIVE | Facility: HOSPITAL | Age: 78
End: 2018-12-19

## 2019-03-21 ENCOUNTER — OFFICE VISIT (OUTPATIENT)
Dept: OTOLARYNGOLOGY | Facility: CLINIC | Age: 79
End: 2019-03-21
Payer: MEDICARE

## 2019-03-21 VITALS — WEIGHT: 211.19 LBS | BODY MASS INDEX: 25.72 KG/M2 | HEIGHT: 76 IN

## 2019-03-21 DIAGNOSIS — Z97.4 WEARS HEARING AID IN BOTH EARS: ICD-10-CM

## 2019-03-21 DIAGNOSIS — H61.23 BILATERAL IMPACTED CERUMEN: ICD-10-CM

## 2019-03-21 DIAGNOSIS — H91.93 BILATERAL HEARING LOSS, UNSPECIFIED HEARING LOSS TYPE: Primary | ICD-10-CM

## 2019-03-21 PROCEDURE — 69210 REMOVE IMPACTED EAR WAX UNI: CPT | Mod: 50,PBBFAC,PO | Performed by: NURSE PRACTITIONER

## 2019-03-21 PROCEDURE — 99999 PR PBB SHADOW E&M-EST. PATIENT-LVL III: ICD-10-PCS | Mod: PBBFAC,,, | Performed by: NURSE PRACTITIONER

## 2019-03-21 PROCEDURE — 99999 PR PBB SHADOW E&M-EST. PATIENT-LVL III: CPT | Mod: PBBFAC,,, | Performed by: NURSE PRACTITIONER

## 2019-03-21 PROCEDURE — 99213 OFFICE O/P EST LOW 20 MIN: CPT | Mod: PBBFAC,PO,25 | Performed by: NURSE PRACTITIONER

## 2019-03-21 PROCEDURE — 69210 PR REMOVAL IMPACTED CERUMEN REQUIRING INSTRUMENTATION, UNILATERAL: ICD-10-PCS | Mod: S$PBB,,, | Performed by: NURSE PRACTITIONER

## 2019-03-21 PROCEDURE — 99499 NO LOS: ICD-10-PCS | Mod: S$PBB,,, | Performed by: NURSE PRACTITIONER

## 2019-03-21 PROCEDURE — 99499 UNLISTED E&M SERVICE: CPT | Mod: S$PBB,,, | Performed by: NURSE PRACTITIONER

## 2019-03-21 PROCEDURE — 69210 REMOVE IMPACTED EAR WAX UNI: CPT | Mod: S$PBB,,, | Performed by: NURSE PRACTITIONER

## 2019-03-21 NOTE — PROGRESS NOTES
Subjective:       Patient ID: Jet Hernandez is a 79 y.o. male.    Chief Complaint: Cerumen Impaction    HPI  Patient's current hearing aids are 7 years old. He is in the process of obtaining new hearing aids through Dr. Jennings at [facility] in [place]. He would like cerumen removed prior to seeing her for his new hearing aids. He denies otalgia or otorrhea. He denies any other ENT symptoms or concerns at present.     Review of Systems   Constitutional: Negative.  Negative for fatigue and fever.   HENT: Positive for hearing loss (wears hearing aids AU). Negative for ear discharge.    Eyes: Negative.    Respiratory: Negative.  Negative for cough, shortness of breath and wheezing.    Cardiovascular: Negative.    Gastrointestinal: Negative.    Musculoskeletal: Negative.  Negative for neck pain.   Skin: Negative.  Negative for pallor and rash.   Neurological: Negative.    Psychiatric/Behavioral: Negative.        Objective:      Physical Exam   Constitutional: He is oriented to person, place, and time. Vital signs are normal. He appears well-developed and well-nourished. He is cooperative. He does not appear ill. No distress.   HENT:   Head: Normocephalic and atraumatic.   Right Ear: Tympanic membrane, external ear and ear canal normal. No drainage. Tympanic membrane is not erythematous. No middle ear effusion.   Left Ear: Tympanic membrane, external ear and ear canal normal. No drainage. Tympanic membrane is not erythematous.  No middle ear effusion.   Nose: No mucosal edema or rhinorrhea.   Mouth/Throat: Uvula is midline, oropharynx is clear and moist and mucous membranes are normal. Mucous membranes are not pale, not dry and not cyanotic. No oral lesions. No posterior oropharyngeal edema or posterior oropharyngeal erythema.     SEPARATE PROCEDURE IN OFFICE:   Procedure: Removal of impacted cerumen, bilateral   Pre Procedure Diagnosis: Cerumen Impaction   Post Procedure Diagnosis: Cerumen Impaction    Verbal informed consent in regards to risk of trauma to ear canal, ear drum or hearing, discomfort during procedure and/or inability to remove cerumen impaction in one session or unforeseen events or complications.   No anesthesia.     Procedure in detail:   Ear canal visualized bilateral with appropriate size ear speculum utilizing Operating Head Binocular Otomicroscope   Utilizing the following: Suction cannula used AU. The impacted cerumen of the ear canals was removed atraumatically. The TM and EAC were then inspected and found to be clear of wax. See description of TMs/EACs in PE above.   Complications: No   Condition: Improved/Good     Eyes: Right eye exhibits no discharge. Left eye exhibits no discharge. No scleral icterus.   Neck: Trachea normal and normal range of motion. Neck supple. No tracheal deviation present. No thyroid mass and no thyromegaly present.   Pulmonary/Chest: Effort normal. No respiratory distress. He has no wheezes.   Musculoskeletal: Normal range of motion.   Lymphadenopathy:     He has no cervical adenopathy.   Neurological: He is alert and oriented to person, place, and time.   Skin: Skin is warm, dry and intact. No lesion and no rash noted. He is not diaphoretic. No erythema. No pallor.   Psychiatric: He has a normal mood and affect. His speech is normal and behavior is normal. Judgment and thought content normal. Cognition and memory are normal.   Nursing note and vitals reviewed.      Assessment:     Cerumen impactions removed    Hearing loss, wears hearing aids AU  Plan:     Patient states he would like to have his ears cleaned every 7-8 months.      Return as needed for further ENT concerns

## 2019-06-03 ENCOUNTER — PATIENT OUTREACH (OUTPATIENT)
Dept: ADMINISTRATIVE | Facility: HOSPITAL | Age: 79
End: 2019-06-03

## 2019-06-03 NOTE — PROGRESS NOTES
Health Maintenance Due   Topic Date Due    TETANUS VACCINE  03/11/1958    Shingles Vaccine (1 of 2) 03/11/1991    Pneumococcal Vaccine (65+ Low/Medium Risk) (2 of 2 - PPSV23) 02/06/2017     Chart review complete/scrubbed 06/03/2019  Links down 06/03/2019. Digital [facility] Outreach.    Future Appointments   Date Time Provider Department Center   6/17/2019 11:00 AM DE Wyatt MD [facility]

## 2019-06-03 NOTE — LETTER
June 11, 2019    Jet Hernandez  [address]  [facility] [address]             [facility]  [address]  [facility] [address]  Phone: [phone] Dear Mrs. Hernandez:    Letter sent as mychart unread.    [facility] is committed to your overall health.  To help you get the most out of each of your visits, we will review your information to make sure you are up to date on all of your recommended tests and/or procedures.       FIDELIA Wyatt MD   has found that your chart shows you may be due for the following:     TETANUS VACCINE   Pneumococcal Vaccine     Also, You have been selected for enrollment in a Hypertension Digital Medicine Program byFIDELIA Wyatt MD .  As a participant, you will be able to send home BLOOD PRESSURE readings directly from your smartphone into your medical record at [facility]. FIDELIA Wyatt MD.  FIDELIA Wyatt MD will discuss more about the program at your upcoming appointment.     If you have had any of the above done at another facility, please bring the records with you or fax them to [phone] so that your record at [facility] will be complete. If you have not had any of these tests or procedures done recently and would like to complete this testing ,  please call [phone] or send a message through your [facility] portal to your provider's office.     If you have an upcoming scheduled appointment for the above test and/or procedures, please disregard this letter.     If you are currently taking medication, please bring it with you to your appointment for review.     Sincerely,     Your [facility] Care[facility]isela Mckay   Clinical Care Coordinator   [facility]         If you have any questions or concerns, please don't hesitate to call.

## 2019-06-05 LAB
CHOLEST SERPL-MSCNC: 145 MG/DL (ref 0–200)
HDLC SERPL-MCNC: 45 MG/DL
LDL CHOLESTEROL DIRECT: 69 MG/DL
LDLC SERPL CALC-MCNC: 64 MG/DL
TRIGL SERPL-MCNC: 182 MG/DL
VLDLC SERPL-MCNC: 36 MG/DL

## 2019-07-25 ENCOUNTER — OFFICE VISIT (OUTPATIENT)
Dept: OTOLARYNGOLOGY | Facility: CLINIC | Age: 79
End: 2019-07-25
Payer: MEDICARE

## 2019-07-25 VITALS — HEIGHT: 76 IN | BODY MASS INDEX: 25.59 KG/M2 | WEIGHT: 210.13 LBS

## 2019-07-25 DIAGNOSIS — H61.23 BILATERAL IMPACTED CERUMEN: Primary | ICD-10-CM

## 2019-07-25 DIAGNOSIS — Z97.4 WEARS HEARING AID IN BOTH EARS: ICD-10-CM

## 2019-07-25 PROCEDURE — 99999 PR PBB SHADOW E&M-EST. PATIENT-LVL III: CPT | Mod: PBBFAC,,, | Performed by: NURSE PRACTITIONER

## 2019-07-25 PROCEDURE — 69210 REMOVE IMPACTED EAR WAX UNI: CPT | Mod: S$PBB,,, | Performed by: NURSE PRACTITIONER

## 2019-07-25 PROCEDURE — 99499 UNLISTED E&M SERVICE: CPT | Mod: S$PBB,,, | Performed by: NURSE PRACTITIONER

## 2019-07-25 PROCEDURE — 99499 NO LOS: ICD-10-PCS | Mod: S$PBB,,, | Performed by: NURSE PRACTITIONER

## 2019-07-25 PROCEDURE — 99999 PR PBB SHADOW E&M-EST. PATIENT-LVL III: ICD-10-PCS | Mod: PBBFAC,,, | Performed by: NURSE PRACTITIONER

## 2019-07-25 PROCEDURE — 69210 PR REMOVAL IMPACTED CERUMEN REQUIRING INSTRUMENTATION, UNILATERAL: ICD-10-PCS | Mod: S$PBB,,, | Performed by: NURSE PRACTITIONER

## 2019-07-25 PROCEDURE — 69210 REMOVE IMPACTED EAR WAX UNI: CPT | Mod: 50,PBBFAC,PO | Performed by: NURSE PRACTITIONER

## 2019-07-25 PROCEDURE — 99213 OFFICE O/P EST LOW 20 MIN: CPT | Mod: PBBFAC,PO,25 | Performed by: NURSE PRACTITIONER

## 2019-07-25 RX ORDER — FINASTERIDE 5 MG/1
TABLET, FILM COATED ORAL
Refills: 3 | COMMUNITY
Start: 2019-07-06 | End: 2021-05-18

## 2019-07-25 RX ORDER — SILDENAFIL CITRATE 20 MG/1
TABLET ORAL
Refills: 5 | COMMUNITY
Start: 2019-07-11

## 2019-07-25 NOTE — PROGRESS NOTES
Subjective:       Patient ID: Jet Hernandez is a 79 y.o. male.    Chief Complaint: Cerumen Impaction    HPI  Patient wears hearing aids through Dr. Jennings at [facility] in [place]. He would like cerumen removed to improve his hearing aid use. He denies otalgia or otorrhea. He denies any other ENT symptoms or concerns at present.     Review of Systems   Constitutional: Negative.  Negative for fatigue and fever.   HENT: Positive for hearing loss (wears hearing aids AU). Negative for ear discharge.    Eyes: Negative.    Respiratory: Negative.  Negative for cough, shortness of breath and wheezing.    Cardiovascular: Negative.    Gastrointestinal: Negative.    Musculoskeletal: Negative.  Negative for neck pain.   Skin: Negative.  Negative for pallor and rash.   Neurological: Negative.    Psychiatric/Behavioral: Negative.        Objective:      Physical Exam   Constitutional: He is oriented to person, place, and time. Vital signs are normal. He appears well-developed and well-nourished. He is cooperative. He does not appear ill. No distress.   HENT:   Head: Normocephalic and atraumatic.   Right Ear: Tympanic membrane, external ear and ear canal normal. No drainage. Tympanic membrane is not erythematous. No middle ear effusion.   Left Ear: Tympanic membrane, external ear and ear canal normal. No drainage. Tympanic membrane is not erythematous.  No middle ear effusion.   Nose: No mucosal edema or rhinorrhea.   Mouth/Throat: Uvula is midline, oropharynx is clear and moist and mucous membranes are normal. Mucous membranes are not pale, not dry and not cyanotic. No oral lesions. No posterior oropharyngeal edema or posterior oropharyngeal erythema.     SEPARATE PROCEDURE IN OFFICE:   Procedure: Removal of impacted cerumen, bilateral   Pre Procedure Diagnosis: Cerumen Impaction   Post Procedure Diagnosis: Cerumen Impaction   Verbal informed consent in regards to risk of trauma to ear canal, ear drum or hearing,  discomfort during procedure and/or inability to remove cerumen impaction in one session or unforeseen events or complications.   No anesthesia.     Procedure in detail:   Ear canal visualized bilateral with appropriate size ear speculum utilizing Operating Head Binocular Otomicroscope   Utilizing the following: Suction cannula used AU. The impacted cerumen of the ear canals was removed atraumatically. The TM and EAC were then inspected and found to be clear of wax. See description of TMs/EACs in PE above.   Complications: No   Condition: Improved/Good     Eyes: Right eye exhibits no discharge. Left eye exhibits no discharge. No scleral icterus.   Neck: Trachea normal and normal range of motion. Neck supple. No tracheal deviation present. No thyroid mass and no thyromegaly present.   Pulmonary/Chest: Effort normal. No respiratory distress. He has no wheezes.   Musculoskeletal: Normal range of motion.   Lymphadenopathy:     He has no cervical adenopathy.   Neurological: He is alert and oriented to person, place, and time.   Skin: Skin is warm, dry and intact. No lesion and no rash noted. He is not diaphoretic. No erythema. No pallor.   Psychiatric: He has a normal mood and affect. His speech is normal and behavior is normal. Judgment and thought content normal. Cognition and memory are normal.   Nursing note and vitals reviewed.      Assessment:     Cerumen impactions removed    Hearing loss, wears hearing aids AU  Plan:     Patient states he would like to have his ears cleaned every 7-8 months.      Return as needed for further ENT concerns

## 2019-12-26 ENCOUNTER — PATIENT OUTREACH (OUTPATIENT)
Dept: ADMINISTRATIVE | Facility: HOSPITAL | Age: 79
End: 2019-12-26

## 2019-12-26 NOTE — PROGRESS NOTES
Health Maintenance Due   Topic Date Due    Shingles Vaccine (1 of 2) 03/11/1991     Future Appointments   Date Time Provider Department Center   1/9/2020  2:00 PM DE Wyatt MD [facility]

## 2019-12-27 LAB
CHOLEST SERPL-MSCNC: 144 MG/DL (ref 0–200)
HDLC SERPL-MCNC: 42 MG/DL
LDLC SERPL CALC-MCNC: 71 MG/DL
TRIGL SERPL-MCNC: 153 MG/DL
VLDLC SERPL-MCNC: 31 MG/DL

## 2020-01-09 ENCOUNTER — OFFICE VISIT (OUTPATIENT)
Dept: FAMILY MEDICINE | Facility: CLINIC | Age: 80
End: 2020-01-09
Payer: MEDICARE

## 2020-01-09 VITALS
DIASTOLIC BLOOD PRESSURE: 72 MMHG | SYSTOLIC BLOOD PRESSURE: 134 MMHG | HEART RATE: 64 BPM | WEIGHT: 207.25 LBS | BODY MASS INDEX: 25.24 KG/M2 | OXYGEN SATURATION: 96 % | HEIGHT: 76 IN | TEMPERATURE: 98 F

## 2020-01-09 DIAGNOSIS — I10 ESSENTIAL HYPERTENSION: Primary | ICD-10-CM

## 2020-01-09 DIAGNOSIS — E78.5 HYPERLIPIDEMIA, UNSPECIFIED HYPERLIPIDEMIA TYPE: ICD-10-CM

## 2020-01-09 DIAGNOSIS — J44.9 CHRONIC OBSTRUCTIVE PULMONARY DISEASE, UNSPECIFIED COPD TYPE: Chronic | ICD-10-CM

## 2020-01-09 PROCEDURE — 99999 PR PBB SHADOW E&M-EST. PATIENT-LVL III: ICD-10-PCS | Mod: PBBFAC,,, | Performed by: FAMILY MEDICINE

## 2020-01-09 PROCEDURE — 99999 PR PBB SHADOW E&M-EST. PATIENT-LVL III: CPT | Mod: PBBFAC,,, | Performed by: FAMILY MEDICINE

## 2020-01-09 PROCEDURE — 99214 PR OFFICE/OUTPT VISIT, EST, LEVL IV, 30-39 MIN: ICD-10-PCS | Mod: S$PBB,,, | Performed by: FAMILY MEDICINE

## 2020-01-09 PROCEDURE — 1125F AMNT PAIN NOTED PAIN PRSNT: CPT | Mod: ,,, | Performed by: FAMILY MEDICINE

## 2020-01-09 PROCEDURE — 1159F MED LIST DOCD IN RCRD: CPT | Mod: ,,, | Performed by: FAMILY MEDICINE

## 2020-01-09 PROCEDURE — 99214 OFFICE O/P EST MOD 30 MIN: CPT | Mod: S$PBB,,, | Performed by: FAMILY MEDICINE

## 2020-01-09 PROCEDURE — 1159F PR MEDICATION LIST DOCUMENTED IN MEDICAL RECORD: ICD-10-PCS | Mod: ,,, | Performed by: FAMILY MEDICINE

## 2020-01-09 PROCEDURE — 99213 OFFICE O/P EST LOW 20 MIN: CPT | Mod: PBBFAC,PO | Performed by: FAMILY MEDICINE

## 2020-01-09 PROCEDURE — 1125F PR PAIN SEVERITY QUANTIFIED, PAIN PRESENT: ICD-10-PCS | Mod: ,,, | Performed by: FAMILY MEDICINE

## 2020-01-09 RX ORDER — FLUTICASONE PROPIONATE 50 MCG
1 SPRAY, SUSPENSION (ML) NASAL DAILY
Qty: 16 G | Refills: 2 | Status: SHIPPED | OUTPATIENT
Start: 2020-01-09 | End: 2020-04-14

## 2020-01-09 NOTE — PROGRESS NOTES
Subjective:       Patient ID: Jet Hernandez is a 79 y.o. male.    Chief Complaint: Follow-up (6 month)    Here for f/u htn and chronic medical issues. Doing well overall.     Hypertension   This is a chronic problem. The current episode started more than 1 year ago. The problem is controlled. Pertinent negatives include no chest pain, headaches, neck pain, palpitations or shortness of breath.   Hyperlipidemia   This is a chronic problem. The current episode started more than 1 year ago. The problem is controlled. Pertinent negatives include no chest pain or shortness of breath.     Review of Systems   Constitutional: Negative for activity change, chills, fever and unexpected weight change.   HENT: Negative for hearing loss, rhinorrhea and trouble swallowing.    Eyes: Negative for discharge and visual disturbance.   Respiratory: Negative for cough, chest tightness, shortness of breath and wheezing.    Cardiovascular: Negative for chest pain, palpitations and leg swelling.   Gastrointestinal: Negative for blood in stool, constipation, diarrhea and vomiting.   Endocrine: Negative for cold intolerance, heat intolerance, polydipsia and polyuria.   Genitourinary: Negative for difficulty urinating, hematuria and urgency.   Musculoskeletal: Negative for arthralgias, joint swelling and neck pain.   Neurological: Negative for weakness and headaches.   Psychiatric/Behavioral: Negative for confusion, decreased concentration and dysphoric mood. The patient is not nervous/anxious.        Objective:      Physical Exam   Constitutional: He appears well-developed and well-nourished.   HENT:   Head: Normocephalic and atraumatic.   Cardiovascular: Normal rate, regular rhythm and normal heart sounds.   Pulmonary/Chest: Effort normal and breath sounds normal.   Psychiatric: He has a normal mood and affect.   Nursing note and vitals reviewed.      Assessment:       1. Essential hypertension    2. Hyperlipidemia, unspecified  hyperlipidemia type    3. Chronic obstructive pulmonary disease, unspecified COPD type        Plan:       Essential hypertension    Hyperlipidemia, unspecified hyperlipidemia type    Chronic obstructive pulmonary disease, unspecified COPD type    Other orders  -     fluticasone propionate (FLONASE) 50 mcg/actuation nasal spray; 1 spray (50 mcg total) by Each Nostril route once daily.  Dispense: 16 g; Refill: 2    reviewed labs from Dr. Lindo  htn stable  Lipid stable  Home bp log and report if >140/90 and f/u with cardiology as planned  Elects no med for copd     Follow up in about 6 months (around 7/9/2020), or if symptoms worsen or fail to improve.

## 2020-01-15 ENCOUNTER — PATIENT OUTREACH (OUTPATIENT)
Dept: ADMINISTRATIVE | Facility: HOSPITAL | Age: 80
End: 2020-01-15

## 2020-01-17 ENCOUNTER — HOSPITAL ENCOUNTER (OUTPATIENT)
Dept: RADIOLOGY | Facility: HOSPITAL | Age: 80
Discharge: HOME | End: 2020-01-17
Attending: PHYSICIAN ASSISTANT
Payer: MEDICARE

## 2020-01-17 ENCOUNTER — OFFICE VISIT (OUTPATIENT)
Dept: FAMILY MEDICINE | Facility: CLINIC | Age: 80
End: 2020-01-17
Payer: MEDICARE

## 2020-01-17 VITALS
HEART RATE: 65 BPM | SYSTOLIC BLOOD PRESSURE: 162 MMHG | BODY MASS INDEX: 25.57 KG/M2 | DIASTOLIC BLOOD PRESSURE: 84 MMHG | WEIGHT: 210.13 LBS | OXYGEN SATURATION: 93 % | TEMPERATURE: 99 F

## 2020-01-17 DIAGNOSIS — R06.2 WHEEZING ON BOTH SIDES OF CHEST: ICD-10-CM

## 2020-01-17 DIAGNOSIS — J44.9 CHRONIC OBSTRUCTIVE PULMONARY DISEASE, UNSPECIFIED COPD TYPE: ICD-10-CM

## 2020-01-17 DIAGNOSIS — I10 ESSENTIAL HYPERTENSION: ICD-10-CM

## 2020-01-17 DIAGNOSIS — J22 LOWER RESPIRATORY INFECTION (E.G., BRONCHITIS, PNEUMONIA, PNEUMONITIS, PULMONITIS): Primary | ICD-10-CM

## 2020-01-17 PROCEDURE — 99214 PR OFFICE/OUTPT VISIT, EST, LEVL IV, 30-39 MIN: ICD-10-PCS | Mod: S$PBB,,, | Performed by: PHYSICIAN ASSISTANT

## 2020-01-17 PROCEDURE — 71046 XR CHEST PA AND LATERAL: ICD-10-PCS | Mod: 26,,, | Performed by: RADIOLOGY

## 2020-01-17 PROCEDURE — 99214 OFFICE O/P EST MOD 30 MIN: CPT | Mod: S$PBB,,, | Performed by: PHYSICIAN ASSISTANT

## 2020-01-17 PROCEDURE — 99999 PR PBB SHADOW E&M-EST. PATIENT-LVL IV: ICD-10-PCS | Mod: PBBFAC,,, | Performed by: PHYSICIAN ASSISTANT

## 2020-01-17 PROCEDURE — 1159F MED LIST DOCD IN RCRD: CPT | Mod: ,,, | Performed by: PHYSICIAN ASSISTANT

## 2020-01-17 PROCEDURE — 71046 X-RAY EXAM CHEST 2 VIEWS: CPT | Mod: TC,FY,PO

## 2020-01-17 PROCEDURE — 71046 X-RAY EXAM CHEST 2 VIEWS: CPT | Mod: 26,,, | Performed by: RADIOLOGY

## 2020-01-17 PROCEDURE — 1159F PR MEDICATION LIST DOCUMENTED IN MEDICAL RECORD: ICD-10-PCS | Mod: ,,, | Performed by: PHYSICIAN ASSISTANT

## 2020-01-17 PROCEDURE — 99214 OFFICE O/P EST MOD 30 MIN: CPT | Mod: PBBFAC,PO,25 | Performed by: PHYSICIAN ASSISTANT

## 2020-01-17 PROCEDURE — 99999 PR PBB SHADOW E&M-EST. PATIENT-LVL IV: CPT | Mod: PBBFAC,,, | Performed by: PHYSICIAN ASSISTANT

## 2020-01-17 RX ORDER — METHYLPREDNISOLONE 4 MG/1
TABLET ORAL
Qty: 1 PACKAGE | Refills: 0 | Status: SHIPPED | OUTPATIENT
Start: 2020-01-17 | End: 2020-02-07

## 2020-01-17 RX ORDER — DOXYCYCLINE 100 MG/1
100 CAPSULE ORAL 2 TIMES DAILY
Qty: 20 CAPSULE | Refills: 0 | Status: SHIPPED | OUTPATIENT
Start: 2020-01-17 | End: 2020-01-27

## 2020-01-17 NOTE — PROGRESS NOTES
Called patient and explained results.  Patient expressed understanding and will continue plan previously discussed.    Allison Jones PA-C

## 2020-01-17 NOTE — PROGRESS NOTES
Subjective:      Patient ID: Jet Hernandez is a 79 y.o. male.    Chief Complaint: Cough (3 days, cant sleep laying down) and Nasal Congestion  Patient is new to me.    HPI   Patient has had the above symptoms for three days.  History of COPD and pneumonia.  Cough worse when laying down.  Taken robitussin and flonase with some relief.  Patient reports fever.    Review of Systems   Constitutional: Negative for appetite change, chills and fever.   HENT: Positive for congestion and postnasal drip. Negative for ear pain, sinus pressure, sinus pain and sore throat.    Eyes: Negative for pain.   Respiratory: Positive for cough (productive clear sputum), shortness of breath (dyspnea on exertion) and wheezing.    Cardiovascular: Negative for chest pain.   Gastrointestinal: Negative for abdominal pain, constipation, diarrhea, nausea and vomiting.   Musculoskeletal: Negative for myalgias.   Skin: Negative for rash.   Neurological: Negative for dizziness, light-headedness and headaches.       Objective:   BP (!) 162/84 (BP Location: Left arm, Patient Position: Sitting)   Pulse 65   Temp 98.7 °F (37.1 °C)   Wt 95.3 kg (210 lb 1.6 oz)   SpO2 (!) 93%   BMI 25.57 kg/m²      Physical Exam   Constitutional: He is oriented to person, place, and time. He appears well-developed and well-nourished. He is active and cooperative. No distress.   HENT:   Head: Normocephalic and atraumatic.   Right Ear: Hearing, tympanic membrane, external ear and ear canal normal.   Left Ear: Hearing, tympanic membrane, external ear and ear canal normal.   Nose: Nose normal. Right sinus exhibits no maxillary sinus tenderness and no frontal sinus tenderness. Left sinus exhibits no maxillary sinus tenderness and no frontal sinus tenderness.   Mouth/Throat: Oropharynx is clear and moist. No oropharyngeal exudate.   Eyes: Conjunctivae and lids are normal.   Neck: Normal range of motion and phonation normal. Neck supple.   Cardiovascular: Normal rate,  regular rhythm and normal heart sounds. Exam reveals no gallop and no friction rub.   No murmur heard.  Pulmonary/Chest: Effort normal. No stridor. No respiratory distress. He has decreased breath sounds in the left lower field. He has wheezes (diffuse). He has no rhonchi. He has no rales.   Musculoskeletal: Normal range of motion.   Lymphadenopathy:        Head (right side): No submental, no submandibular, no tonsillar, no preauricular, no posterior auricular and no occipital adenopathy present.        Head (left side): No submental, no submandibular, no tonsillar, no preauricular, no posterior auricular and no occipital adenopathy present.     He has no cervical adenopathy.   Neurological: He is alert and oriented to person, place, and time.   Skin: Skin is warm, dry and intact. No rash noted.   Psychiatric: He has a normal mood and affect. His speech is normal and behavior is normal. Judgment and thought content normal.   Vitals reviewed.    Assessment:      1. Lower respiratory infection (e.g., bronchitis, pneumonia, pneumonitis, pulmonitis)    2. Wheezing on both sides of chest    3. Chronic obstructive pulmonary disease, unspecified COPD type    4. Essential hypertension       Plan:   1. Lower respiratory infection (e.g., bronchitis, pneumonia, pneumonitis, pulmonitis)  Take doxycycline twice a day for 10 days and eat yogurt daily.  Start Medrol dose pack today and take as directed on package.  Rest, increase fluids, nasal saline rinse.    Gave strict ER precautions to patient.  - doxycycline (MONODOX) 100 MG capsule; Take 1 capsule (100 mg total) by mouth 2 (two) times daily. for 10 days  Dispense: 20 capsule; Refill: 0  - methylPREDNISolone (MEDROL DOSEPACK) 4 mg tablet; use as directed  Dispense: 1 Package; Refill: 0    2. Wheezing on both sides of chest  Chest xray today.  Will call with results.  - X-Ray Chest PA And Lateral; Future    3. Chronic obstructive pulmonary disease, unspecified COPD type  -  doxycycline (MONODOX) 100 MG capsule; Take 1 capsule (100 mg total) by mouth 2 (two) times daily. for 10 days  Dispense: 20 capsule; Refill: 0  - methylPREDNISolone (MEDROL DOSEPACK) 4 mg tablet; use as directed  Dispense: 1 Package; Refill: 0    4. Essential hypertension  Continue to monitor daily and continue current medication.    Follow up as needed.  Patient agreed with plan and expressed understanding.

## 2020-01-17 NOTE — PATIENT INSTRUCTIONS
Take doxycycline twice a day for 10 days and eat yogurt daily.    Start Medrol dose pack today and take as directed on package.    Rest, increase fluids, nasal saline rinse.    Thanks for seeing me,  Allison Jones PA-C

## 2020-01-20 ENCOUNTER — TELEPHONE (OUTPATIENT)
Dept: FAMILY MEDICINE | Facility: CLINIC | Age: 80
End: 2020-01-20

## 2020-01-20 NOTE — TELEPHONE ENCOUNTER
----- Message from Christina Daigle sent at 1/20/2020  9:30 AM CST -----  Contact: Pt wife Marilee  Type: Needs Medical Advice    Who Called: Marilee  Best Call Back Number: [phone]  Additional Information: Marilee states she's calling to let Np Robert know how pt is doing regarding his condition. Please call Marilee [phone] and advise.

## 2020-01-21 ENCOUNTER — OFFICE VISIT (OUTPATIENT)
Dept: FAMILY MEDICINE | Facility: CLINIC | Age: 80
End: 2020-01-21
Payer: MEDICARE

## 2020-01-21 VITALS
SYSTOLIC BLOOD PRESSURE: 158 MMHG | WEIGHT: 206.38 LBS | OXYGEN SATURATION: 91 % | DIASTOLIC BLOOD PRESSURE: 99 MMHG | TEMPERATURE: 98 F | BODY MASS INDEX: 25.12 KG/M2 | HEART RATE: 66 BPM

## 2020-01-21 DIAGNOSIS — J44.9 CHRONIC OBSTRUCTIVE PULMONARY DISEASE, UNSPECIFIED COPD TYPE: Chronic | ICD-10-CM

## 2020-01-21 DIAGNOSIS — R09.89 RHONCHI: Primary | ICD-10-CM

## 2020-01-21 DIAGNOSIS — I25.10 CORONARY ARTERY DISEASE INVOLVING NATIVE CORONARY ARTERY OF NATIVE HEART WITHOUT ANGINA PECTORIS: ICD-10-CM

## 2020-01-21 DIAGNOSIS — J22 LOWER RESPIRATORY INFECTION (E.G., BRONCHITIS, PNEUMONIA, PNEUMONITIS, PULMONITIS): ICD-10-CM

## 2020-01-21 DIAGNOSIS — I10 ESSENTIAL HYPERTENSION: ICD-10-CM

## 2020-01-21 DIAGNOSIS — R79.81 LOW OXYGEN SATURATION: ICD-10-CM

## 2020-01-21 PROCEDURE — 99999 PR PBB SHADOW E&M-EST. PATIENT-LVL III: ICD-10-PCS | Mod: PBBFAC,,, | Performed by: PHYSICIAN ASSISTANT

## 2020-01-21 PROCEDURE — 99214 PR OFFICE/OUTPT VISIT, EST, LEVL IV, 30-39 MIN: ICD-10-PCS | Mod: S$PBB,,, | Performed by: PHYSICIAN ASSISTANT

## 2020-01-21 PROCEDURE — 99213 OFFICE O/P EST LOW 20 MIN: CPT | Mod: PBBFAC,PO | Performed by: PHYSICIAN ASSISTANT

## 2020-01-21 PROCEDURE — 1159F MED LIST DOCD IN RCRD: CPT | Mod: ,,, | Performed by: PHYSICIAN ASSISTANT

## 2020-01-21 PROCEDURE — 1159F PR MEDICATION LIST DOCUMENTED IN MEDICAL RECORD: ICD-10-PCS | Mod: ,,, | Performed by: PHYSICIAN ASSISTANT

## 2020-01-21 PROCEDURE — 99999 PR PBB SHADOW E&M-EST. PATIENT-LVL III: CPT | Mod: PBBFAC,,, | Performed by: PHYSICIAN ASSISTANT

## 2020-01-21 PROCEDURE — 99214 OFFICE O/P EST MOD 30 MIN: CPT | Mod: S$PBB,,, | Performed by: PHYSICIAN ASSISTANT

## 2020-01-21 RX ORDER — LEVALBUTEROL INHALATION SOLUTION 0.63 MG/3ML
0.63 SOLUTION RESPIRATORY (INHALATION)
Status: DISCONTINUED | OUTPATIENT
Start: 2020-01-21 | End: 2020-01-21

## 2020-01-21 NOTE — PROGRESS NOTES
Subjective:      Patient ID: Jet Hernandez is a 79 y.o. male.    Chief Complaint: Cough and Nasal Congestion    HPI   Patient saw me on 1/17/2020 with lower respiratory infection and treated with doxycycline 100mg BID x 10 days and medrol dosepack.  Spoke with him on the phone 1/20/2020, and patient stated he was improving slightly.  He has now had this illness for 7 days.  Patient is further short of breath today and worsening.    Review of Systems   Constitutional: Positive for appetite change. Negative for chills and fever.   HENT: Positive for postnasal drip. Negative for ear pain, sinus pressure, sinus pain and sore throat.    Eyes: Negative for pain.   Respiratory: Positive for cough (sometimes productive) and shortness of breath.    Cardiovascular: Negative for chest pain.   Gastrointestinal: Positive for constipation. Negative for abdominal pain, blood in stool, diarrhea, nausea and vomiting.   Genitourinary: Negative for dysuria and hematuria.   Neurological: Positive for weakness. Negative for dizziness, light-headedness and headaches.       Objective:   BP (!) 158/99   Pulse 66   Temp 98.3 °F (36.8 °C)   Wt 93.6 kg (206 lb 5.6 oz)   SpO2 (!) 91%   BMI 25.12 kg/m²      Physical Exam   Constitutional: He is oriented to person, place, and time. He appears well-developed and well-nourished. He is active and cooperative. No distress.   HENT:   Head: Normocephalic and atraumatic.   Right Ear: Hearing and external ear normal.   Left Ear: Hearing and external ear normal.   Nose: Nose normal.   Eyes: Conjunctivae and lids are normal.   Neck: Normal range of motion and phonation normal.   Cardiovascular: Normal rate, regular rhythm and normal heart sounds. Exam reveals no gallop and no friction rub.   No murmur heard.  Pulmonary/Chest: Effort normal. No stridor. No respiratory distress. He has no decreased breath sounds. He has no wheezes. He has rhonchi (diffuse). He has no rales.   Musculoskeletal:  Normal range of motion.   Neurological: He is alert and oriented to person, place, and time.   Skin: Skin is warm, dry and intact. No rash noted.   Psychiatric: He has a normal mood and affect. His speech is normal and behavior is normal. Judgment and thought content normal.   Vitals reviewed.     Assessment:      1. Rhonchi    2. Low oxygen saturation    3. Lower respiratory infection (e.g., bronchitis, pneumonia, pneumonitis, pulmonitis)    4. Chronic obstructive pulmonary disease, unspecified COPD type    5. Essential hypertension    6. Coronary artery disease involving native coronary artery of native heart without angina pectoris       Plan:   1. Kiara  Considered giving a breathing treatment in the visit, but patient's blood pressure continued to rise during visit to 170/88 and did not drop.    2. Low oxygen saturation  Has dropped from 93% on 1/17/2020.    3. Lower respiratory infection (e.g., bronchitis, pneumonia, pneumonitis, pulmonitis)  Treated with doxycycline 100mg BID x 10 days and medrol dosepack on 1/17/2020.  Chest xray was clear on that day, but he did have some slight wheezes in bilateral bases of lungs on auscultation.    4. Chronic obstructive pulmonary disease, unspecified COPD type  Never been treated for this, but has been diagnosed in the past.    5. Essential hypertension  Uncontrolled currently.    6. Coronary artery disease involving native coronary artery of native heart without angina pectoris  Dr. Lindo manages this.    Sent to ER for management.  Patient and wife choose to go to [facility] for treatment.

## 2020-04-14 RX ORDER — FLUTICASONE PROPIONATE 50 MCG
SPRAY, SUSPENSION (ML) NASAL
Qty: 48 G | Refills: 0 | Status: SHIPPED | OUTPATIENT
Start: 2020-04-14

## 2020-04-14 NOTE — TELEPHONE ENCOUNTER
Refill Authorization Note     is requesting a refill authorization.    Brief assessment and rationale for refill: APPROVE: prr          Medication Therapy Plan: fluticasone lco(lov)                              Comments:   Refill Center Care Gap Closure protocols temporarily suspended.   Requested Prescriptions   Signed Prescriptions Disp Refills    fluticasone propionate (FLONASE) 50 mcg/actuation nasal spray 48 g 0     Sig: SHAKE LIQUID AND USE 1 SPRAY(50 MCG) IN EACH NOSTRIL EVERY DAY       Ear, Nose, and Throat: Nasal Preparations - Corticosteroids Failed - 4/8/2020 10:30 AM        Failed - An appropriate indication is on the problem list     Allergic Rhinitis  Sinusitis  Seasonal Allergies              Passed - Patient is at least 18 years old        Passed - Office visit in past 12 months or future 90 days.     Recent Outpatient Visits            2 months ago Rhonchi    [facility] Allison Jones PA-C    2 months ago Lower respiratory infection (e.g., bronchitis, pneumonia, pneumonitis, pulmonitis)    [facility] Allison Jones PA-C    3 months ago Essential hypertension    [facility] DE Wyatt MD    8 months ago Bilateral impacted cerumen    [facility] EBONI Cabrera NP    1 year ago Bilateral hearing loss, unspecified hearing loss type    [facility]  EBONI Cabrera NP          Future Appointments              In 3 months DE Wyatt MD [facility]                 Appointments  past 12m or future 3m with PCP    Date Provider   Last Visit   1/9/2020 DE Wyatt MD   Next Visit   7/13/2020 DE Wyatt MD   .  ED visits in past 90 days: [unfilled]       Note composed:10:57 AM 04/14/2020

## 2020-05-05 ENCOUNTER — PATIENT MESSAGE (OUTPATIENT)
Dept: ADMINISTRATIVE | Facility: HOSPITAL | Age: 80
End: 2020-05-05

## 2020-06-29 ENCOUNTER — PATIENT OUTREACH (OUTPATIENT)
Dept: ADMINISTRATIVE | Facility: HOSPITAL | Age: 80
End: 2020-06-29

## 2020-06-29 NOTE — PROGRESS NOTES
Chart review completed 2020.  Care Everywhere updates requested and reviewed.  Immunizations reconciled. Media reports reviewed.  Duplicate HM overrides and  orders removed.  Overdue HM topic chart audit and/or requested.  Overdue lab testing linked to upcoming lab appointments if applies.    Lab debbie, and [employer] reviewed      Requested colonoscopy records.    Health Maintenance Due   Topic Date Due    Shingles Vaccine (1 of 2) 1991

## 2020-06-29 NOTE — LETTER
AUTHORIZATION FOR RELEASE OF   CONFIDENTIAL INFORMATION    Dear Zurdo Keys Jr., MD,     We are seeing Jet Hernandez, date of birth 1940, in the clinic at [facility]. FIDELIA Wyatt MD is the patient's PCP. Jet Hernandez has an outstanding lab/procedure at the time we reviewed his chart. In order to help keep his health information updated, he has authorized us to request the following medical record(s):        COLONOSCOPY          Please fax records to [facility], W Michael Ellerbe, MD, [phone]     If you have any questions, please contact   Janie Amaya Care Coordinator  [facility]  Phone: [phone]  FAX: [phone]          Patient Name: Jet Hernandez  : 1940  Patient Phone #: [phone]

## 2020-08-28 ENCOUNTER — OFFICE VISIT (OUTPATIENT)
Dept: OTOLARYNGOLOGY | Facility: CLINIC | Age: 80
End: 2020-08-28
Payer: MEDICARE

## 2020-08-28 VITALS — BODY MASS INDEX: 25.15 KG/M2 | HEIGHT: 76 IN | TEMPERATURE: 97 F | WEIGHT: 206.56 LBS

## 2020-08-28 DIAGNOSIS — R26.89 IMBALANCE: Primary | ICD-10-CM

## 2020-08-28 DIAGNOSIS — H61.23 BILATERAL IMPACTED CERUMEN: ICD-10-CM

## 2020-08-28 DIAGNOSIS — Z97.4 WEARS HEARING AID IN BOTH EARS: ICD-10-CM

## 2020-08-28 PROCEDURE — 99213 OFFICE O/P EST LOW 20 MIN: CPT | Mod: 25,S$PBB,, | Performed by: NURSE PRACTITIONER

## 2020-08-28 PROCEDURE — 69210 REMOVE IMPACTED EAR WAX UNI: CPT | Mod: 50,PBBFAC,PO | Performed by: NURSE PRACTITIONER

## 2020-08-28 PROCEDURE — 69210 REMOVE IMPACTED EAR WAX UNI: CPT | Mod: S$PBB,,, | Performed by: NURSE PRACTITIONER

## 2020-08-28 PROCEDURE — 99213 OFFICE O/P EST LOW 20 MIN: CPT | Mod: PBBFAC,PO | Performed by: NURSE PRACTITIONER

## 2020-08-28 PROCEDURE — 99999 PR PBB SHADOW E&M-EST. PATIENT-LVL III: ICD-10-PCS | Mod: PBBFAC,,, | Performed by: NURSE PRACTITIONER

## 2020-08-28 PROCEDURE — 99213 PR OFFICE/OUTPT VISIT, EST, LEVL III, 20-29 MIN: ICD-10-PCS | Mod: 25,S$PBB,, | Performed by: NURSE PRACTITIONER

## 2020-08-28 PROCEDURE — 99999 PR PBB SHADOW E&M-EST. PATIENT-LVL III: CPT | Mod: PBBFAC,,, | Performed by: NURSE PRACTITIONER

## 2020-08-28 PROCEDURE — 69210 PR REMOVAL IMPACTED CERUMEN REQUIRING INSTRUMENTATION, UNILATERAL: ICD-10-PCS | Mod: S$PBB,,, | Performed by: NURSE PRACTITIONER

## 2020-08-28 RX ORDER — ALBUTEROL SULFATE 90 UG/1
AEROSOL, METERED RESPIRATORY (INHALATION)
COMMUNITY
Start: 2020-06-29 | End: 2021-11-10 | Stop reason: SDUPTHER

## 2020-08-28 NOTE — PROGRESS NOTES
Subjective:       Patient ID: Jet Hernandez is a 80 y.o. male.    Chief Complaint: No chief complaint on file.    HPI  Patient wears hearing aids through Dr. Jennings at [facility] in [place]. He would like cerumen removed to improve his hearing aid use. He denies otalgia or otorrhea. Patient has been experiencing some equilibrium/balance trouble upon getting up quickly in the morning. He denies any true vertigo; no spinning or swirling. He denies any other ENT symptoms or concerns at present.     Review of Systems   Constitutional: Negative.  Negative for fatigue and fever.   HENT: Positive for hearing loss (wears hearing aids AU). Negative for ear discharge.    Eyes: Negative.    Respiratory: Negative.  Negative for cough, shortness of breath and wheezing.    Cardiovascular: Negative.    Gastrointestinal: Negative.    Musculoskeletal: Negative.  Negative for neck pain.   Skin: Negative.  Negative for pallor and rash.   Neurological: Negative.    Psychiatric/Behavioral: Negative.        Objective:      Physical Exam   Constitutional: He is oriented to person, place, and time. Vital signs are normal. He appears well-developed and well-nourished. He is cooperative. He does not appear ill. No distress.   HENT:   Head: Normocephalic and atraumatic.   Right Ear: Tympanic membrane, external ear and ear canal normal. No drainage. Tympanic membrane is not erythematous. No middle ear effusion.   Left Ear: Tympanic membrane, external ear and ear canal normal. No drainage. Tympanic membrane is not erythematous.  No middle ear effusion.     SEPARATE PROCEDURE IN OFFICE:   Procedure: Removal of impacted cerumen, bilateral   Pre Procedure Diagnosis: Cerumen Impaction   Post Procedure Diagnosis: Cerumen Impaction   Verbal informed consent in regards to risk of trauma to ear canal, ear drum or hearing, discomfort during procedure and/or inability to remove cerumen impaction in one session or unforeseen events or  complications.   No anesthesia.     Procedure in detail:   Ear canal visualized bilateral with appropriate size ear speculum utilizing Operating Head Binocular Otomicroscope   Utilizing the following: Suction cannula used AU. The impacted cerumen of the ear canals was removed atraumatically. The TM and EAC were then inspected and found to be clear of wax. See description of TMs/EACs in PE above.   Complications: No   Condition: Improved/Good     Eyes: Right eye exhibits no discharge. Left eye exhibits no discharge. No scleral icterus.   Neck: Trachea normal and normal range of motion. Neck supple. No tracheal deviation present.   Pulmonary/Chest: Effort normal. No respiratory distress. He has no wheezes.   Musculoskeletal: Normal range of motion.   Neurological: He is alert and oriented to person, place, and time.   Skin: Skin is warm, dry and intact. No lesion and no rash noted. He is not diaphoretic. No erythema. No pallor.   Psychiatric: He has a normal mood and affect. His speech is normal and behavior is normal. Judgment and thought content normal. Cognition and memory are normal.   Nursing note and vitals reviewed.      Assessment:     Cerumen impactions removed    Hearing loss, wears hearing aids AU  Imbalance  Situational stress  Plan:     We discussed imbalance versus vertigo. Encouraged to return to ENT for vertigo. Consider blood pressure, blood sugar, dehydration, side effects of medications, anxiety, etc. for imbalance.   Patient states he would like to have his ears cleaned every 7-8 months.      Return as needed for further ENT concerns

## 2020-09-29 ENCOUNTER — PATIENT MESSAGE (OUTPATIENT)
Dept: OTHER | Facility: OTHER | Age: 80
End: 2020-09-29

## 2020-12-11 ENCOUNTER — PATIENT MESSAGE (OUTPATIENT)
Dept: OTHER | Facility: OTHER | Age: 80
End: 2020-12-11

## 2021-01-08 ENCOUNTER — IMMUNIZATION (OUTPATIENT)
Dept: FAMILY MEDICINE | Facility: CLINIC | Age: 81
End: 2021-01-08
Payer: MEDICARE

## 2021-01-08 ENCOUNTER — PATIENT MESSAGE (OUTPATIENT)
Dept: FAMILY MEDICINE | Facility: CLINIC | Age: 81
End: 2021-01-08

## 2021-01-08 DIAGNOSIS — Z23 NEED FOR VACCINATION: ICD-10-CM

## 2021-01-08 PROCEDURE — 91300 COVID-19, MRNA, LNP-S, PF, 30 MCG/0.3 ML DOSE VACCINE: CPT | Mod: PBBFAC,PO

## 2021-01-30 ENCOUNTER — IMMUNIZATION (OUTPATIENT)
Dept: FAMILY MEDICINE | Facility: CLINIC | Age: 81
End: 2021-01-30
Payer: MEDICARE

## 2021-01-30 DIAGNOSIS — Z23 NEED FOR VACCINATION: Primary | ICD-10-CM

## 2021-01-30 PROCEDURE — 0002A COVID-19, MRNA, LNP-S, PF, 30 MCG/0.3 ML DOSE VACCINE: CPT | Mod: PBBFAC,PO

## 2021-01-30 PROCEDURE — 91300 COVID-19, MRNA, LNP-S, PF, 30 MCG/0.3 ML DOSE VACCINE: CPT | Mod: PBBFAC,PO

## 2021-04-06 ENCOUNTER — PATIENT MESSAGE (OUTPATIENT)
Dept: ADMINISTRATIVE | Facility: HOSPITAL | Age: 81
End: 2021-04-06

## 2021-05-18 ENCOUNTER — OFFICE VISIT (OUTPATIENT)
Dept: OTOLARYNGOLOGY | Facility: CLINIC | Age: 81
End: 2021-05-18
Payer: MEDICARE

## 2021-05-18 VITALS — WEIGHT: 212.5 LBS | HEIGHT: 76 IN | BODY MASS INDEX: 25.88 KG/M2

## 2021-05-18 DIAGNOSIS — H61.23 BILATERAL IMPACTED CERUMEN: ICD-10-CM

## 2021-05-18 DIAGNOSIS — Z97.4 WEARS HEARING AID IN BOTH EARS: Primary | ICD-10-CM

## 2021-05-18 PROCEDURE — 99999 PR PBB SHADOW E&M-EST. PATIENT-LVL III: CPT | Mod: PBBFAC,,, | Performed by: NURSE PRACTITIONER

## 2021-05-18 PROCEDURE — 99999 PR PBB SHADOW E&M-EST. PATIENT-LVL III: ICD-10-PCS | Mod: PBBFAC,,, | Performed by: NURSE PRACTITIONER

## 2021-05-18 PROCEDURE — 99213 OFFICE O/P EST LOW 20 MIN: CPT | Mod: PBBFAC,PO | Performed by: NURSE PRACTITIONER

## 2021-05-18 PROCEDURE — 69210 REMOVE IMPACTED EAR WAX UNI: CPT | Mod: S$PBB,,, | Performed by: NURSE PRACTITIONER

## 2021-05-18 PROCEDURE — 69210 REMOVE IMPACTED EAR WAX UNI: CPT | Mod: 50,PBBFAC,PO | Performed by: NURSE PRACTITIONER

## 2021-05-18 PROCEDURE — 99499 UNLISTED E&M SERVICE: CPT | Mod: S$PBB,,, | Performed by: NURSE PRACTITIONER

## 2021-05-18 PROCEDURE — 69210 PR REMOVAL IMPACTED CERUMEN REQUIRING INSTRUMENTATION, UNILATERAL: ICD-10-PCS | Mod: S$PBB,,, | Performed by: NURSE PRACTITIONER

## 2021-05-18 PROCEDURE — 99499 NO LOS: ICD-10-PCS | Mod: S$PBB,,, | Performed by: NURSE PRACTITIONER

## 2021-05-18 RX ORDER — DICLOFENAC SODIUM 10 MG/G
GEL TOPICAL
COMMUNITY
End: 2022-08-04

## 2021-05-18 RX ORDER — CETIRIZINE HYDROCHLORIDE 10 MG/1
CAPSULE, LIQUID FILLED ORAL
COMMUNITY
End: 2023-09-26

## 2021-07-07 ENCOUNTER — PATIENT MESSAGE (OUTPATIENT)
Dept: ADMINISTRATIVE | Facility: HOSPITAL | Age: 81
End: 2021-07-07

## 2022-01-31 ENCOUNTER — TELEPHONE (OUTPATIENT)
Dept: OTOLARYNGOLOGY | Facility: CLINIC | Age: 82
End: 2022-01-31
Payer: MEDICARE

## 2022-01-31 NOTE — TELEPHONE ENCOUNTER
----- Message from Tricia Glynn sent at 1/31/2022  9:02 AM CST -----  Type: Needs Medical Advice  Who Called:  Pt Wife  Best Call Back Number: [phone]   Additional Information: Pt wife requesting sooner appt-sts pt has lost all hearing and he will be going out of town Friday-requesting to be seen this week--please advise--thank you

## 2022-02-02 ENCOUNTER — TELEPHONE (OUTPATIENT)
Dept: OTOLARYNGOLOGY | Facility: CLINIC | Age: 82
End: 2022-02-02
Payer: MEDICARE

## 2022-02-02 NOTE — TELEPHONE ENCOUNTER
----- Message from Dc Burger MD sent at 2/2/2022  7:49 AM CST -----  Contact: pt    ----- Message -----  From: Kadeem Rendon  Sent: 2/1/2022  11:36 AM CST  To: Dc Burger MD    Type:  Sooner Apoointment Request    Caller is requesting a sooner appointment.  Caller declined first available appointment listed below.  Caller will not accept being placed on the waitlist and is requesting a message be sent to doctor.    Name of Caller: wife/cristofer  When is the first available appointment?  N/a  Symptoms:  sudden hearing loss  Best Call Back Number:  [phone]    Additional Information:

## 2022-02-02 NOTE — TELEPHONE ENCOUNTER
I spoke with the wife they are not sure if they still need the appointment.  The patient will call back.

## 2022-02-04 ENCOUNTER — PES CALL (OUTPATIENT)
Dept: ADMINISTRATIVE | Facility: CLINIC | Age: 82
End: 2022-02-04
Payer: MEDICARE

## 2022-03-30 ENCOUNTER — IMMUNIZATION (OUTPATIENT)
Dept: FAMILY MEDICINE | Facility: CLINIC | Age: 82
End: 2022-03-30
Payer: MEDICARE

## 2022-03-30 DIAGNOSIS — Z23 NEED FOR VACCINATION: Primary | ICD-10-CM

## 2022-03-30 PROCEDURE — 91305 COVID-19, MRNA, LNP-S, PF, 30 MCG/0.3 ML DOSE VACCINE (PFIZER): CPT | Mod: PBBFAC,PO

## 2022-05-31 ENCOUNTER — PATIENT MESSAGE (OUTPATIENT)
Dept: ADMINISTRATIVE | Facility: HOSPITAL | Age: 82
End: 2022-05-31
Payer: MEDICARE

## 2022-06-09 ENCOUNTER — PATIENT MESSAGE (OUTPATIENT)
Dept: FAMILY MEDICINE | Facility: CLINIC | Age: 82
End: 2022-06-09
Payer: MEDICARE

## 2022-06-09 ENCOUNTER — TELEPHONE (OUTPATIENT)
Dept: FAMILY MEDICINE | Facility: CLINIC | Age: 82
End: 2022-06-09
Payer: MEDICARE

## 2022-08-04 ENCOUNTER — OFFICE VISIT (OUTPATIENT)
Dept: FAMILY MEDICINE | Facility: CLINIC | Age: 82
End: 2022-08-04
Payer: MEDICARE

## 2022-08-04 DIAGNOSIS — K21.9 GASTROESOPHAGEAL REFLUX DISEASE WITHOUT ESOPHAGITIS: Chronic | ICD-10-CM

## 2022-08-04 DIAGNOSIS — C61 PROSTATE CANCER: ICD-10-CM

## 2022-08-04 DIAGNOSIS — Z01.89 ENCOUNTER FOR LABORATORY TEST: ICD-10-CM

## 2022-08-04 DIAGNOSIS — Z76.89 ENCOUNTER TO ESTABLISH CARE WITH NEW DOCTOR: ICD-10-CM

## 2022-08-04 DIAGNOSIS — E78.5 HYPERLIPIDEMIA, UNSPECIFIED HYPERLIPIDEMIA TYPE: ICD-10-CM

## 2022-08-04 DIAGNOSIS — Z95.5 S/P CORONARY ARTERY STENT PLACEMENT: ICD-10-CM

## 2022-08-04 DIAGNOSIS — I10 PRIMARY HYPERTENSION: Primary | ICD-10-CM

## 2022-08-04 DIAGNOSIS — R73.09 IMPAIRED GLUCOSE METABOLISM: ICD-10-CM

## 2022-08-04 DIAGNOSIS — I10 WHITE COAT SYNDROME WITH DIAGNOSIS OF HYPERTENSION: ICD-10-CM

## 2022-08-04 DIAGNOSIS — J44.9 CHRONIC OBSTRUCTIVE PULMONARY DISEASE, UNSPECIFIED COPD TYPE: Chronic | ICD-10-CM

## 2022-08-04 DIAGNOSIS — I25.10 CORONARY ARTERY DISEASE INVOLVING NATIVE CORONARY ARTERY OF NATIVE HEART WITHOUT ANGINA PECTORIS: ICD-10-CM

## 2022-08-04 PROCEDURE — 99999 PR PBB SHADOW E&M-EST. PATIENT-LVL IV: CPT | Mod: PBBFAC,,, | Performed by: INTERNAL MEDICINE

## 2022-08-04 PROCEDURE — 99999 PR PBB SHADOW E&M-EST. PATIENT-LVL IV: ICD-10-PCS | Mod: PBBFAC,,, | Performed by: INTERNAL MEDICINE

## 2022-08-04 PROCEDURE — 99215 PR OFFICE/OUTPT VISIT, EST, LEVL V, 40-54 MIN: ICD-10-PCS | Mod: S$PBB,,, | Performed by: INTERNAL MEDICINE

## 2022-08-04 PROCEDURE — 99214 OFFICE O/P EST MOD 30 MIN: CPT | Mod: PBBFAC,PN | Performed by: INTERNAL MEDICINE

## 2022-08-04 PROCEDURE — 99215 OFFICE O/P EST HI 40 MIN: CPT | Mod: S$PBB,,, | Performed by: INTERNAL MEDICINE

## 2022-08-04 RX ORDER — CLOPIDOGREL BISULFATE 75 MG/1
TABLET ORAL
COMMUNITY

## 2022-08-04 RX ORDER — ROSUVASTATIN CALCIUM 20 MG/1
TABLET, COATED ORAL
COMMUNITY
End: 2022-08-04 | Stop reason: SDUPTHER

## 2022-08-04 NOTE — PROGRESS NOTES
Subjective:       Patient ID: Jet Hernandez is a 82 y.o. male.      Patient here today to establish with me as his new PCP at [facility]. Past medical history and surgical history delineated and noted. Social medical history and family medical history also delineated and noted.  Review of systems obtained at length prior to physical exam being performed.  Medications reviewed as well and addressed.  Labs reviewed and ordered for follow-up as needed.      Chief Complaint: No chief complaint on file.    HPI:  Patient here today to establish care with me as his new PCP at [facility].  Above database has been delineated and noted.  Primary hypertension; Maintain < 2 Gm Na a day diet, and monitor BP at home; keep a log office review.  Has hydralazine to use p.r.n. elevated blood pressure.  Blood pressure here manually 158 over 80 with pulse 63; average at home 120s over 70s.  Suspected white coat syndrome component  -     Comprehensive Metabolic Panel; Future; Expected date: 08/04/2022  -     CBC Auto Differential; Future; Expected date: 08/04/2022  -     TSH; Future; Expected date: 08/04/2022  -     Magnesium; Future; Expected date: 08/04/2022  White coat syndrome with a diagnosis of hypertension:  Remember to bring his home blood pressure logs to office for review; avoid caffeine prior to the office visit  Hyperlipidemia, unspecified hyperlipidemia type: Maintain low fat high fiber diet, exercise regularly. Weight reduction where indicated. Previously on simvastatin 40 mg; card changing to rosuvastatin and agree; to call w amount; lipid tx as per card Dr Lindo.  01/19/2022 Dr. Lindo:  Cholesterol panel:  Cholesterol 164/triglyceride 210/HDL 49/LDL 80  -     Comprehensive Metabolic Panel; Future; Expected date: 08/04/2022  Chronic obstructive pulmonary disease, unspecified COPD type; use albuterol rescue only when needed. Presently 1-2x a day. Good airflow.  Past smoker half pack per  day quit at 41; smoked around 20 years; Symbicort 80/4.5 at 2 puffs twice a day as maintenance inhaler.  S/P coronary artery stent placement; echo 08/10/2021 with ejection fraction 60%; 01/17/2001 coronary stent to the circumflex.  Exercise stress test positive led to left heart catheterization 3-4 months ago; 2 PTCA/coronary stents placed.  CAD has been stable without any chest pain, shortness of breath, or palpitations. Keep follow up with cardiologist as directed. Card is Dr Lindo.  In cardiac rehab at present  -     Hemoglobin A1C; Future; Expected date: 08/04/2022  -     Comprehensive Metabolic Panel; Future; Expected date: 08/04/2022  -     CBC Auto Differential; Future; Expected date: 08/04/2022  -     TSH; Future; Expected date: 08/04/2022  -     Magnesium; Future; Expected date: 08/04/2022  Coronary artery disease involving native coronary artery of native heart without angina pectoris  Gastroesophageal reflux disease without esophagitis; No bedtime snacks; weight reduction. On nexium 20 mg a day  Prostate cancer: s/p 43 XRT's; November 2021 finished.  Dr Cesar Buchanan Rad Onc.  Dr. Saucedo is his urologist; follows his PSA every 6 months.  Initially at 3 months after x-ray treatments.  Encounter to establish care with new doctor  Encounter for laboratory test:  Labs reviewed and ordered for follow-up  Impaired glucose metabolism; Exercise recommended with weight reduction and low carb diet; we'll follow hemoglobin A1c's with you periodically.  -     Hemoglobin A1C; Future; Expected date: 08/04/2022  -     Comprehensive Metabolic Panel; Future; Expected date: 08/04/2022  Total time: 3:09 p.m. through 3:56 p.m..  Greater than 50% of time spent in discussion, counseling, and review.  Labs reviewed discussed with patient and ordered for follow-up.  Medications reviewed and addressed.  Various different diagnosis is discussed including plan of care.          Vitals:    08/04/22 1455 08/04/22 1520   BP: (!)  "158/80 125/75  Comment: Average at home   Pulse: 63    Resp: 14    Temp: 97.6 °F (36.4 °C)    TempSrc: Oral    SpO2: 99%    Weight: 93.5 kg (206 lb 0.3 oz)    Height: 6' 3" (1.905 m)        BMI Readings from Last 3 Encounters:   08/04/22 25.75 kg/m²   11/10/21 26.00 kg/m²   05/18/21 26.50 kg/m²        Wt Readings from Last 3 Encounters:   08/04/22 1455 93.5 kg (206 lb 0.3 oz)   11/10/21 1317 94.3 kg (208 lb)   05/18/21 1326 96.2 kg (212 lb)        BP Readings from Last 3 Encounters:   08/04/22 125/75   11/10/21 (!) 165/94   10/27/20 (!) 186/89        There are no preventive care reminders to display for this patient.     Health Maintenance Due   Topic Date Due    Shingles Vaccine (1 of 2) Never done         Past Medical History:   Diagnosis Date    Allergy     Coronary artery disease     stent x 1    GERD (gastroesophageal reflux disease)     Hearing loss     Hyperlipidemia     Hypertension     Otitis media        Past Surgical History:   Procedure Laterality Date    CARPAL TUNNEL RELEASE  February 2012    right wrist    CHOLECYSTECTOMY  11/2007    CORONARY ANGIOPLASTY  August 2000    circumflex    CORONARY STENT PLACEMENT  July 2000    EYE SURGERY  November 2012    cataract right eye    HERNIA REPAIR      right inguinal    SPINE SURGERY  May 2005    spinal laminectomy and fusion       Social History     Tobacco Use    Smoking status: Former Smoker     Packs/day: 0.50     Years: 10.00     Pack years: 5.00    Smokeless tobacco: Never Used   Substance Use Topics    Alcohol use: Yes     Comment: 4 times per week    Drug use: No       History reviewed. No pertinent family history.    Review of patient's allergies indicates:   Allergen Reactions    No known drug allergies        Current Outpatient Medications on File Prior to Visit   Medication Sig Dispense Refill    albuterol (PROVENTIL/VENTOLIN HFA) 90 mcg/actuation inhaler INL 1 TO 2 PFS PO Q 4 TO 6 H PRF SOB OR WHZ OR CHEST TIGHTNESS 18 g 11    " aspirin 81 mg Tab Take by mouth once daily.       carvediloL (COREG) 12.5 MG tablet Take 12.5 mg by mouth 2 (two) times daily.       cetirizine (ZYRTEC) 10 mg Cap Zyrtec 10 mg capsule   Take 1 capsule every day by oral route.      clopidogreL (PLAVIX) 75 mg tablet clopidogrel 75 mg tablet   Take 1 tablet every day by oral route as directed for 90 days.      esomeprazole (NEXIUM) 20 MG capsule Take 20 mg by mouth before breakfast.      fluticasone propionate (FLONASE) 50 mcg/actuation nasal spray SHAKE LIQUID AND USE 1 SPRAY(50 MCG) IN EACH NOSTRIL EVERY DAY 48 g 0    glucosamine-chondroitin 500-400 mg tablet Take 1 tablet by mouth 3 (three) times daily.      hydrochlorothiazide (MICROZIDE) 12.5 mg capsule Take 12.5 mg by mouth once daily.       ramipril (ALTACE) 10 MG capsule Take 10 mg by mouth 2 (two) times daily.       sildenafil (REVATIO) 20 mg Tab TAKE 3 TABLETS BY MOUTH EVERY DAY AS NEEDED AS DIRECTED  5    simvastatin (ZOCOR) 40 MG tablet Take 40 mg by mouth every evening.        No current facility-administered medications on file prior to visit.     Review of Systems   Constitutional: Negative for appetite change and unexpected weight change.   HENT: Negative for congestion, postnasal drip, rhinorrhea and sinus pressure.         Denies seasonal allergies, or perennial allergies   Eyes: Negative for discharge and itching.   Respiratory: Negative for cough, chest tightness, shortness of breath and wheezing.    Cardiovascular: Negative for chest pain, palpitations and leg swelling.   Gastrointestinal: Negative for abdominal pain, blood in stool, constipation, diarrhea, nausea and vomiting.   Endocrine: Negative for polydipsia, polyphagia and polyuria.   Genitourinary: Negative for dysuria and hematuria.   Musculoskeletal: Negative for arthralgias and myalgias.   Skin: Negative for rash.   Allergic/Immunologic: Negative for environmental allergies and food allergies.   Neurological: Negative for  "tremors, seizures and headaches.   Hematological: Negative for adenopathy. Does not bruise/bleed easily.   Psychiatric/Behavioral: Negative for dysphoric mood. The patient is not nervous/anxious.         Denies anxiety or depression.       Objective:      Vitals:    08/04/22 1455 08/04/22 1520   BP: (!) 158/80 125/75  Comment: Average at home   Pulse: 63    Resp: 14    Temp: 97.6 °F (36.4 °C)    TempSrc: Oral    SpO2: 99%    Weight: 93.5 kg (206 lb 0.3 oz)    Height: 6' 3" (1.905 m)      Body mass index is 25.75 kg/m².    Physical Exam  Vitals reviewed.   Constitutional:       Appearance: He is well-developed.   HENT:      Head: Normocephalic and atraumatic.   Neck:      Thyroid: No thyromegaly.      Vascular: No carotid bruit.   Cardiovascular:      Rate and Rhythm: Normal rate and regular rhythm.      Heart sounds: Normal heart sounds. No murmur heard.    No gallop.   Pulmonary:      Effort: Pulmonary effort is normal. No respiratory distress.      Breath sounds: Normal breath sounds. No wheezing or rales.   Abdominal:      General: Bowel sounds are normal. There is no distension.      Palpations: Abdomen is soft.      Tenderness: There is no abdominal tenderness. There is no guarding or rebound.   Musculoskeletal:         General: Normal range of motion.      Cervical back: Normal range of motion and neck supple.      Right lower leg: No edema.      Left lower leg: No edema.   Lymphadenopathy:      Cervical: No cervical adenopathy.   Skin:     Findings: No rash.   Neurological:      Mental Status: He is alert and oriented to person, place, and time.      Comments: Moves all 4 extremities fine.   Psychiatric:         Behavior: Behavior normal.         Thought Content: Thought content normal.         Assessment:       1. Primary hypertension    2. White coat syndrome with diagnosis of hypertension    3. Hyperlipidemia, unspecified hyperlipidemia type    4. Chronic obstructive pulmonary disease, unspecified COPD " type    5. S/P coronary artery stent placement    6. Coronary artery disease involving native coronary artery of native heart without angina pectoris    7. Gastroesophageal reflux disease without esophagitis    8. Prostate cancer    9. Impaired glucose metabolism    10. Encounter to establish care with new doctor    11. Encounter for laboratory test        Plan:       Primary hypertension; Maintain < 2 Gm Na a day diet, and monitor BP at home; keep a log.  -     Comprehensive Metabolic Panel; Future; Expected date: 08/04/2022  -     CBC Auto Differential; Future; Expected date: 08/04/2022  -     TSH; Future; Expected date: 08/04/2022  -     Magnesium; Future; Expected date: 08/04/2022    White coat syndrome with a diagnosis of hypertension:  Remember to bring his home blood pressure logs to office for review; avoid caffeine prior to the office visit    Hyperlipidemia, unspecified hyperlipidemia type: Maintain low fat high fiber diet, exercise regularly. Weight reduction where indicated. Previously on simvastatin 40 mg; card chhanging to rosuvastatin; to call w amount; lipid tx as per card Dr Lindo.   -     Comprehensive Metabolic Panel; Future; Expected date: 08/04/2022    Chronic obstructive pulmonary disease, unspecified COPD type; use albuterol rescue only when needed. Presently 1-2x a day. Good airflow.  On maintenance inhaler Symbicort 80/4.5 at 2 puffs b.i.d.    S/P coronary artery stent placement; CAD has been stable without any chest pain, shortness of breath, or palpitations. Keep follow up with cardiologist as directed. Card is Dr Lindo.   -     Hemoglobin A1C; Future; Expected date: 08/04/2022  -     Comprehensive Metabolic Panel; Future; Expected date: 08/04/2022  -     CBC Auto Differential; Future; Expected date: 08/04/2022  -     TSH; Future; Expected date: 08/04/2022  -     Magnesium; Future; Expected date: 08/04/2022    Coronary artery disease involving native coronary artery of native heart without  angina pectoris    Gastroesophageal reflux disease without esophagitis; No bedtime snacks; weight reduction. On nexium 20 mg a day    Prostate cancer: s/p 43 XRT's; November 2021 finished.  Dr Cesar Buchanan Rad Onc.  Dr. Saucedo is his urologist; follows his PSA every 6 months.  Initially at 3 months after x-ray treatments.    Encounter to establish care with new doctor    Encounter for laboratory test    Impaired glucose metabolism; Exercise recommended with weight reduction and low carb diet; we'll follow hemoglobin A1c's with you periodically.  -     Hemoglobin A1C; Future; Expected date: 08/04/2022  -     Comprehensive Metabolic Panel; Future; Expected date: 08/04/2022

## 2022-08-04 NOTE — PATIENT INSTRUCTIONS
;      [facility] Emergency Department  [address], [place] [address]  [phone]     Clinical Summary     PERSON INFORMATION   Name AGUSTIN RECIO Age  89 Years  10/14/1930 12:00 AM   Acct# [id number] Sex Male Phone [phone]   Dispo Type Still a patient - 30 Arrival 2020 11:58 AM Checkout 2020 2:52 PM    Address: [address] [place] [address]      Visit Reason AMS UTI SX     ED Physician Note     Patient:   AGUSTIN RECIO            MRN: [id number]            FIN: [id number]               Age:   89 years     Sex:  Male     :  10/14/1930   Associated Diagnoses:   None   Author:   Chu Aldrich MD      History of Present Illness   89-year-old male with history of recurrent UTIs presents with altered mental status.  He lives at home with his wife, per EMS his son states over the past couple of days he has been increasingly lethargic, and today has been bedridden, not responding normally, and not getting up.  No reported fevers.  No reported vomiting or diarrhea.  No further history at this time as the patient's son is not at bedside, and the patient gives no history.      Review of Systems   Neurologic symptoms:  Altered level of consciousness.             Additional review of systems information: Unable to obtain due to: Altered mental status.            Health Status   Allergies:    Allergic Reactions (All)  NKA.      Past Medical/ Family/ Social History   Problem list:    Active Problems (1)  Cough   .   PAST MEDICAL HISTORY:    1. Basal cell carcinoma of the scalp status post removal in  at [place].  2. Bilateral lower extremity chronic swelling due to venous stasis.  3. BPH.    4. History UTI.    PAST SURGICAL HISTORY:  Appendectomy, basal cell carcinoma removal in 2014 from scalp area.    Social History: No tobacco or EtOH.         Physical Examination     General Apperance: Appears weak, fatigued.  Skin: No rash, no diaphoresis.  HEENT:  Primary hypertension; Maintain < 2 Gm Na a day diet, and monitor BP at home; keep a log.  -     Comprehensive Metabolic Panel; Future; Expected date: 08/04/2022  -     CBC Auto Differential; Future; Expected date: 08/04/2022  -     TSH; Future; Expected date: 08/04/2022  -     Magnesium; Future; Expected date: 08/04/2022    Hyperlipidemia, unspecified hyperlipidemia type: Maintain low fat high fiber diet, exercise regularly. Weight reduction where indicated. Previously on simvastatin 40 mg; card chhanging to rosuvastatin; to call w amount; lipid tx as per barb Lindo.   -     Comprehensive Metabolic Panel; Future; Expected date: 08/04/2022    Chronic obstructive pulmonary disease, unspecified COPD type; use albuterol rescue only when needed. Presently 1-2x a day. Good airflow.     S/P coronary artery stent placement; CAD has been stable without any chest pain, shortness of breath, or palpitations. Keep follow up with cardiologist as directed. Card is Dr Lindo.   -     Hemoglobin A1C; Future; Expected date: 08/04/2022  -     Comprehensive Metabolic Panel; Future; Expected date: 08/04/2022  -     CBC Auto Differential; Future; Expected date: 08/04/2022  -     TSH; Future; Expected date: 08/04/2022  -     Magnesium; Future; Expected date: 08/04/2022    Coronary artery disease involving native coronary artery of native heart without angina pectoris    Gastroesophageal reflux disease without esophagitis; No bedtime snacks; weight reduction. On nexium 20 mg a day    Prostate cancer: s/p 43 XRT's Dr Cesar Buchanan Rad Onc.     Encounter to establish care with new doctor    Encounter for laboratory test    Impaired glucose metabolism; Exercise recommended with weight reduction and low carb diet; we'll follow hemoglobin A1c's with you periodically.  -     Hemoglobin A1C; Future; Expected date: 08/04/2022  -     Comprehensive Metabolic Panel; Future; Expected date: 08/04/2022           Normocephalic/atraumatic, PERRL, sclera anicteric, mucous membranes moist, posterior oropharynx normal.  Neck: Normal range of motion, non-tender, no LAD.  Chest and Lungs: Bilateral breath sounds equal, clear to auscultation. No rales, wheezes, ronchi or stridor.  Cardiovascular: Regular rate and rhythm, no murmur, no rub.  Abdomen: Soft, non-tender, non-distended.  Back: Normal, non-tender.  Musculoskeletal: No edema or tenderness, FROM throughout, no injury.  Neurologic: Lethargic, responds and opens eyes to verbal and painful stimuli, but does not answer any questions, does not follow any commands.  He does move all of his extremities spontaneously, no obvious focal findings, no obvious facial droop.      Medical Decision Making   EKG shows normal sinus rhythm at 79 bpm, no acute ischemic changes noted, no STEMI, intervals are otherwise normal.  Cardiac monitor showed normal sinus rhythm, no arrhythmia.    Labs are obtained, hemoglobin is very low at 6.2, other labs are overall unremarkable.  Troponin is negative. Urinalysis does show probable urine infection, culture pending.  Blood cultures pending.  Stool Hemoccult negative for blood.  CT head negative for acute findings.  Chest x-ray shows no acute findings.    This 89-year-old male presents with weakness, lethargy worsening over the last couple of days, altered mental status.  He does appear to have UTI, he also has a low hemoglobin, no blood in stool here. He is given IVF, IV Abx for UTI, and plan transfusion for anemia as I suspect this is a factor in his symptoms.  Patient requires admission for transfusion, antibiotics, close monitoring.  I discussed with Dr. Sheffield for Dr. Cloud, he agrees to admit, no GI consultation at this time, he would like admission under Dr. Melton.  Vital signs stable.      Impression and Plan   Diagnosis   Anemia  UTI  Altered mental status and weakness   Plan   Condition: Stable.    Disposition: Admit time  2/9/2020  13:40:00, Admit to Inpatient Unit.        ED Time Seen By Provider Entered On:  2/9/2020 12:01     Performed On:  2/9/2020 12:01  by Chu Aldrich MD               Time Seen By Provider   Time Seen by Provider :   2/9/2020 12:01    Chu Aldrich MD - 2/9/2020 12:01           VITALS INFORMATION  Vitals/Ht/Wt  Temperature Tympanic:  97.2 F  Peripheral Pulse Rate:  83 bpm  Respiratory Rate:  18 br/min  Oxygen Saturation:  97 %  Oxygen Therapy:  Room air  Systolic Blood Pressure:  120 mmHg  Diastolic Blood Pressure:  68 mmHg  Mean Arterial Pressure:  85 mmHg  Height:  0 cm  Weight:  58.9 kg  ED Hand-Off Communication  ED Hand-Off Reason:  In Hospital Transfer  ED Hand-Off Reason / In Hospital:  SBAR reviewed during report  Patient on Cardiac Monitor:  No  Sitter Present:  No  Potential Core Measure:  N/A  Hand-off Report Given to:  Pearl Coronado       MEDICAL INFORMATION   Allergy Info:          NKA             Prescriptions:            DISCHARGE INFORMATION   Discharge Disposition: Still a patient - 30     PATIENT EDUCATION INFORMATION   Instructions:          Follow up:            DIAGNOSIS

## 2022-08-05 ENCOUNTER — TELEPHONE (OUTPATIENT)
Dept: FAMILY MEDICINE | Facility: CLINIC | Age: 82
End: 2022-08-05

## 2022-08-05 NOTE — TELEPHONE ENCOUNTER
----- Message from Qasim Dasilva sent at 8/5/2022  9:34 AM CDT -----  Contact: pt at [phone]  Type: Needs Medical Advice  Who Called:  pt  Best Call Back Number: [phone]  Additional Information: pt is calling the office to give the doctor name and mg of another medication Rosuvastatin 20 mg. Please call back and advise.

## 2022-08-05 NOTE — TELEPHONE ENCOUNTER
Previously on simvastatin 40 mg; card chhanging to rosuvastatin; to call w amount; lipid tx as per card Dr Lindo.     Crestor 20mg

## 2022-08-20 ENCOUNTER — PATIENT MESSAGE (OUTPATIENT)
Dept: FAMILY MEDICINE | Facility: CLINIC | Age: 82
End: 2022-08-20
Payer: MEDICARE

## 2022-08-20 VITALS
DIASTOLIC BLOOD PRESSURE: 75 MMHG | HEIGHT: 75 IN | BODY MASS INDEX: 25.61 KG/M2 | TEMPERATURE: 98 F | RESPIRATION RATE: 14 BRPM | SYSTOLIC BLOOD PRESSURE: 125 MMHG | OXYGEN SATURATION: 99 % | WEIGHT: 206 LBS | HEART RATE: 63 BPM

## 2022-09-15 ENCOUNTER — OFFICE VISIT (OUTPATIENT)
Dept: FAMILY MEDICINE | Facility: CLINIC | Age: 82
End: 2022-09-15
Payer: MEDICARE

## 2022-09-15 VITALS
HEART RATE: 53 BPM | SYSTOLIC BLOOD PRESSURE: 136 MMHG | OXYGEN SATURATION: 98 % | WEIGHT: 207.31 LBS | DIASTOLIC BLOOD PRESSURE: 72 MMHG | HEIGHT: 75 IN | BODY MASS INDEX: 25.78 KG/M2

## 2022-09-15 DIAGNOSIS — Z01.89 ENCOUNTER FOR LABORATORY TEST: ICD-10-CM

## 2022-09-15 DIAGNOSIS — E78.49 OTHER HYPERLIPIDEMIA: ICD-10-CM

## 2022-09-15 DIAGNOSIS — N28.9 RENAL INSUFFICIENCY: ICD-10-CM

## 2022-09-15 DIAGNOSIS — R73.09 IMPAIRED GLUCOSE METABOLISM: ICD-10-CM

## 2022-09-15 DIAGNOSIS — I10 PRIMARY HYPERTENSION: Primary | ICD-10-CM

## 2022-09-15 DIAGNOSIS — I25.10 CORONARY ARTERY DISEASE INVOLVING NATIVE CORONARY ARTERY OF NATIVE HEART WITHOUT ANGINA PECTORIS: ICD-10-CM

## 2022-09-15 DIAGNOSIS — F10.20 ALCOHOL DEPENDENCE, UNCOMPLICATED: ICD-10-CM

## 2022-09-15 DIAGNOSIS — Z78.9 CAFFEINE USE: ICD-10-CM

## 2022-09-15 DIAGNOSIS — C61 PROSTATE CANCER: ICD-10-CM

## 2022-09-15 DIAGNOSIS — D64.9 NORMOCYTIC ANEMIA: ICD-10-CM

## 2022-09-15 DIAGNOSIS — E66.3 OVERWEIGHT (BMI 25.0-29.9): ICD-10-CM

## 2022-09-15 DIAGNOSIS — E73.9 LACTOSE INTOLERANCE: ICD-10-CM

## 2022-09-15 DIAGNOSIS — Z92.3 S/P RADIATION THERAPY: ICD-10-CM

## 2022-09-15 DIAGNOSIS — Z98.61 S/P PTCA (PERCUTANEOUS TRANSLUMINAL CORONARY ANGIOPLASTY): ICD-10-CM

## 2022-09-15 PROCEDURE — 99999 PR PBB SHADOW E&M-EST. PATIENT-LVL IV: ICD-10-PCS | Mod: PBBFAC,,, | Performed by: INTERNAL MEDICINE

## 2022-09-15 PROCEDURE — 99999 PR PBB SHADOW E&M-EST. PATIENT-LVL IV: CPT | Mod: PBBFAC,,, | Performed by: INTERNAL MEDICINE

## 2022-09-15 PROCEDURE — 99214 OFFICE O/P EST MOD 30 MIN: CPT | Mod: PBBFAC,PN | Performed by: INTERNAL MEDICINE

## 2022-09-15 PROCEDURE — 99214 OFFICE O/P EST MOD 30 MIN: CPT | Mod: S$PBB,,, | Performed by: INTERNAL MEDICINE

## 2022-09-15 PROCEDURE — 99214 PR OFFICE/OUTPT VISIT, EST, LEVL IV, 30-39 MIN: ICD-10-PCS | Mod: S$PBB,,, | Performed by: INTERNAL MEDICINE

## 2022-09-15 NOTE — PROGRESS NOTES
Subjective:       Patient ID: Jet Hernandez is a 82 y.o. male.      Chief Complaint: Follow-up  HPI:  Patient here today for reassessment.  And go over lab work provided for by patient.  Primary hypertension: Maintain < 2 Gm Na a day diet, and monitor BP at home; keep a log. For office review.  BP at home has been running 130s over 80s.  Have recommended that he sit for at least 5 minutes in the chair with his feet flat on the ground before running his blood pressure cuff.  If his cuff is 2 years or older he will need to by and keep a log for office review; manual blood pressure here today is 136/72 with pulse 53 asymptomatic.  -     CBC Auto Differential; Future; Expected date: 09/15/2022  -     Comprehensive Metabolic Panel; Future; Expected date: 09/15/2022  -     Magnesium; Future; Expected date: 09/15/2022  Other hyperlipidemia; Maintain low fat high fiber diet, exercise regularly. Weight reduction where indicated.  On crestor 20 mg a day for 4 weeks now.  09/06/2022 lipid profile:  Cholesterol 130/triglyceride 156/HDL 44/LDL 63.  Cardiology changed his simvastatin to Crestor 1 month ago at 20 mg.  -     Lipid Panel; Future; Expected date: 09/15/2022  S/P PTCA (percutaneous transluminal coronary angioplasty):  Status post PTCA x2 on separate occasions.  keep f/u w card Dr Sharif as directed. On plavix/ASA  -     CBC Auto Differential; Future; Expected date: 09/15/2022  -     Comprehensive Metabolic Panel; Future; Expected date: 09/15/2022  -     Lipid Panel; Future; Expected date: 09/15/2022  -     Magnesium; Future; Expected date: 09/15/2022  -     Hemoglobin A1C; Future; Expected date: 09/15/2022  Coronary artery disease involving native coronary artery of native heart without angina pectoris: CAD has been stable without any chest pain, shortness of breath, or palpitations. Keep follow up with cardiologist as directed.   Renal insufficiency; stop caffeine; no NSAID agents; can use tylenol for pain. Push  fluids min 6-7 glasses a day; goal 7-8 glasses a day.  Limit alcohol and caffeine intake.  09/06/2022 BUN creatinine 26/1.33 with GFR 54  -     CBC Auto Differential; Future; Expected date: 09/15/2022  -     Comprehensive Metabolic Panel; Future; Expected date: 09/15/2022  Caffeine use:  Coffee 1 cup per day; Tea in the afternoon; also includes chocolates but claims now has quit.  Also drinks half a energy drink per day have recommended that he stop that as well  Lactose intolerance; off lactose foods doing a lot better diarrhea has cleared.   -     CBC Auto Differential; Future; Expected date: 09/15/2022  -     Comprehensive Metabolic Panel; Future; Expected date: 09/15/2022  Impaired glucose metabolism; Exercise recommended with weight reduction and low carb diet; we'll follow hemoglobin A1c's with you periodically.  09/06/2022 hemoglobin A1c 6.1; fasting blood sugar 113.  -     Comprehensive Metabolic Panel; Future; Expected date: 09/15/2022  -     Hemoglobin A1C; Future; Expected date: 09/15/2022  Overweight (BMI 25.0-29.9); Caloric restriction w regular exercise and weight reduction.   Normocytic anemia: Men's 50+ MVI one a day w ferrous gluconate/fergon 324 mg a day w vit C 500 mg a day.  09/06/2022 H&H 12.6/38.3 with MCV 96.2.  Platelet count 142  -     CBC Auto Differential; Future; Expected date: 09/15/2022  -     Iron and TIBC; Future; Expected date: 09/15/2022  -     Ferritin; Future; Expected date: 09/15/2022  -     Vitamin B12; Future; Expected date: 09/15/2022  -     Folate; Future; Expected date: 09/15/2022  -     Occult blood x 1, stool; Future; Expected date: 09/15/2022  -     Occult blood x 1, stool; Future; Expected date: 09/15/2022  -     Occult blood x 1, stool; Future; Expected date: 09/15/2022  Alcohol dependence, uncomplicated; wean down daily drink to 2-3 a week.  Presently at 1 per day of 7 per week  -     Iron and TIBC; Future; Expected date: 09/15/2022  -     Ferritin; Future; Expected  "date: 09/15/2022  -     Vitamin B12; Future; Expected date: 09/15/2022  -     Folate; Future; Expected date: 09/15/2022  Encounter for lab test; discussed w pt and reviewed prior lab packet presented.  Collected 09/06/2022, and 5/9/22.  Malignant neoplasm of the prostate:  Sees urologist Dr. Acevedo; status post 43 x-ray tx therapy.  Finished 11/29/2021.  PSA every 6 months; 09/06/2022 PSA 0.08.  Keep follow-up with his urologist as directed and PSAs as per his urologist  Status post completion of radiation therapy for prostate cancer; 43 x-ray treatments; finished x-ray therapy 11/29/2021.        Vitals:    09/15/22 1508   BP: 136/72   Pulse: (!) 53   SpO2: 98%   Weight: 94 kg (207 lb 5.5 oz)   Height: 6' 3" (1.905 m)       BMI Readings from Last 3 Encounters:   09/15/22 25.92 kg/m²   08/04/22 25.75 kg/m²   11/10/21 26.00 kg/m²        Wt Readings from Last 3 Encounters:   09/15/22 1508 94 kg (207 lb 5.5 oz)   08/04/22 1455 93.5 kg (206 lb 0.3 oz)   11/10/21 1317 94.3 kg (208 lb)        BP Readings from Last 3 Encounters:   09/15/22 136/72   08/04/22 125/75   11/10/21 (!) 165/94        There are no preventive care reminders to display for this patient.     Health Maintenance Due   Topic Date Due    Shingles Vaccine (1 of 2) Never done    Influenza Vaccine (1) 09/01/2022         Past Medical History:   Diagnosis Date    Allergy     Coronary artery disease     stent x 1    GERD (gastroesophageal reflux disease)     Hearing loss     Hyperlipidemia     Hypertension     Otitis media        Past Surgical History:   Procedure Laterality Date    CARPAL TUNNEL RELEASE  February 2012    right wrist    CHOLECYSTECTOMY  11/2007    CORONARY ANGIOPLASTY  August 2000    circumflex    CORONARY STENT PLACEMENT  July 2000    EYE SURGERY  November 2012    cataract right eye    HERNIA REPAIR      right inguinal    SPINE SURGERY  May 2005    spinal laminectomy and fusion       Social History     Tobacco Use    Smoking status: Former     " Packs/day: 0.50     Years: 10.00     Pack years: 5.00     Types: Cigarettes    Smokeless tobacco: Never   Substance Use Topics    Alcohol use: Yes     Comment: 4 times per week    Drug use: No       No family history on file.    Review of patient's allergies indicates:   Allergen Reactions    No known drug allergies        Current Outpatient Medications on File Prior to Visit   Medication Sig Dispense Refill    albuterol (PROVENTIL/VENTOLIN HFA) 90 mcg/actuation inhaler INL 1 TO 2 PFS PO Q 4 TO 6 H PRF SOB OR WHZ OR CHEST TIGHTNESS 18 g 11    aspirin 81 mg Tab Take by mouth once daily.       carvediloL (COREG) 12.5 MG tablet Take 12.5 mg by mouth 2 (two) times daily.       cetirizine (ZYRTEC) 10 mg Cap Zyrtec 10 mg capsule   Take 1 capsule every day by oral route.      clopidogreL (PLAVIX) 75 mg tablet clopidogrel 75 mg tablet   Take 1 tablet every day by oral route as directed for 90 days.      esomeprazole (NEXIUM) 20 MG capsule Take 20 mg by mouth before breakfast.      fluticasone propionate (FLONASE) 50 mcg/actuation nasal spray SHAKE LIQUID AND USE 1 SPRAY(50 MCG) IN EACH NOSTRIL EVERY DAY 48 g 0    glucosamine-chondroitin 500-400 mg tablet Take 1 tablet by mouth 3 (three) times daily.      hydrochlorothiazide (MICROZIDE) 12.5 mg capsule Take 12.5 mg by mouth once daily.       ramipril (ALTACE) 10 MG capsule Take 10 mg by mouth 2 (two) times daily.       rosuvastatin 20 mg CpSP       sildenafil (REVATIO) 20 mg Tab TAKE 3 TABLETS BY MOUTH EVERY DAY AS NEEDED AS DIRECTED  5    [DISCONTINUED] simvastatin (ZOCOR) 40 MG tablet Take 40 mg by mouth every evening.        No current facility-administered medications on file prior to visit.     Review of Systems   Constitutional:  Negative for appetite change and unexpected weight change.   HENT:  Negative for congestion, postnasal drip, rhinorrhea and sinus pressure.         Denies seasonal allergies, or perennial allergies   Eyes:  Negative for discharge and itching.  "  Respiratory:  Negative for cough, chest tightness, shortness of breath and wheezing.    Cardiovascular:  Negative for chest pain, palpitations and leg swelling.   Gastrointestinal:  Negative for abdominal pain, blood in stool, constipation, diarrhea, nausea and vomiting.   Endocrine: Negative for polydipsia, polyphagia and polyuria.   Genitourinary:  Negative for dysuria and hematuria.   Musculoskeletal:  Negative for arthralgias and myalgias.   Skin:  Negative for rash.   Allergic/Immunologic: Negative for environmental allergies and food allergies.   Neurological:  Negative for tremors, seizures and headaches.   Hematological:  Negative for adenopathy. Does not bruise/bleed easily.   Psychiatric/Behavioral:  Negative for dysphoric mood. The patient is not nervous/anxious.         Denies anxiety or depression.     Objective:      Vitals:    09/15/22 1508   BP: 136/72   Pulse: (!) 53   SpO2: 98%   Weight: 94 kg (207 lb 5.5 oz)   Height: 6' 3" (1.905 m)     Body mass index is 25.92 kg/m².    Physical Exam  Vitals reviewed.   Constitutional:       Appearance: He is well-developed.   HENT:      Head: Normocephalic and atraumatic.   Neck:      Thyroid: No thyromegaly.      Vascular: No carotid bruit.   Cardiovascular:      Rate and Rhythm: Normal rate and regular rhythm.      Heart sounds: Normal heart sounds. No murmur heard.    No gallop.   Pulmonary:      Effort: Pulmonary effort is normal. No respiratory distress.      Breath sounds: Normal breath sounds. No wheezing or rales.   Abdominal:      General: Bowel sounds are normal. There is no distension.      Palpations: Abdomen is soft.      Tenderness: There is no abdominal tenderness. There is no guarding or rebound.   Musculoskeletal:         General: Normal range of motion.      Cervical back: Normal range of motion and neck supple.      Right lower leg: No edema.      Left lower leg: No edema.   Lymphadenopathy:      Cervical: No cervical adenopathy.   Skin:    "  Findings: No rash.   Neurological:      Mental Status: He is alert and oriented to person, place, and time.      Comments: Moves all 4 extremities fine.   Psychiatric:         Behavior: Behavior normal.         Thought Content: Thought content normal.       Assessment:       1. Primary hypertension    2. Other hyperlipidemia    3. S/P PTCA (percutaneous transluminal coronary angioplasty)    4. Coronary artery disease involving native coronary artery of native heart without angina pectoris    5. Renal insufficiency    6. Lactose intolerance    7. Impaired glucose metabolism    8. Overweight (BMI 25.0-29.9)    9. Normocytic anemia    10. Alcohol dependence, uncomplicated    11. Encounter for laboratory test          Plan:       Primary hypertension: Maintain < 2 Gm Na a day diet, and monitor BP at home; keep a log. For office review.  BP at home has been running 130s over 80s.  Have recommended that he sit for at least 5 minutes in the chair with his feet flat on the ground before running his blood pressure cuff.  If his cuff is 2 years or older he will need to by and keep a log for office review; manual blood pressure here today is 136/72 with pulse 53 asymptomatic.  -     CBC Auto Differential; Future; Expected date: 09/15/2022  -     Comprehensive Metabolic Panel; Future; Expected date: 09/15/2022  -     Magnesium; Future; Expected date: 09/15/2022    Other hyperlipidemia; Maintain low fat high fiber diet, exercise regularly. Weight reduction where indicated.  On crestor 20 mg a day for 4 weeks now.  09/06/2022 lipid profile:  Cholesterol 130/triglyceride 156/HDL 44/LDL 63.  Cardiology changed his simvastatin to Crestor 1 month ago at 20 mg.  -     Lipid Panel; Future; Expected date: 09/15/2022    S/P PTCA (percutaneous transluminal coronary angioplasty):  Status post PTCA x2 on separate occasions.  keep f/u w card Dr Sharif as directed. On plavix/ASA  -     CBC Auto Differential; Future; Expected date:  09/15/2022  -     Comprehensive Metabolic Panel; Future; Expected date: 09/15/2022  -     Lipid Panel; Future; Expected date: 09/15/2022  -     Magnesium; Future; Expected date: 09/15/2022  -     Hemoglobin A1C; Future; Expected date: 09/15/2022    Coronary artery disease involving native coronary artery of native heart without angina pectoris: CAD has been stable without any chest pain, shortness of breath, or palpitations. Keep follow up with cardiologist as directed.      Renal insufficiency; stop caffeine; no NSAID agents; can use tylenol for pain. Push fluids min 6-7 glasses a day; goal 7-8 glasses a day.  Limit alcohol and caffeine intake.  09/06/2022 BUN creatinine 26/1.33 with GFR 54  -     CBC Auto Differential; Future; Expected date: 09/15/2022  -     Comprehensive Metabolic Panel; Future; Expected date: 09/15/2022    Caffeine use:  Coffee 1 cup per day; Tea in the afternoon; also includes chocolates but claims now has quit.  Also drinks half a energy drink per day have recommended that he stop that as well    Lactose intolerance; off lactose foods doing a lot better diarrhea has cleared.   -     CBC Auto Differential; Future; Expected date: 09/15/2022  -     Comprehensive Metabolic Panel; Future; Expected date: 09/15/2022    Impaired glucose metabolism; Exercise recommended with weight reduction and low carb diet; we'll follow hemoglobin A1c's with you periodically.  09/06/2022 hemoglobin A1c 6.1; fasting blood sugar 113.  -     Comprehensive Metabolic Panel; Future; Expected date: 09/15/2022  -     Hemoglobin A1C; Future; Expected date: 09/15/2022    Overweight (BMI 25.0-29.9); Caloric restriction w regular exercise and weight reduction.     Normocytic anemia: Men's 50+ MVI one a day w ferrous gluconate/fergon 324 mg a day w vit C 500 mg a day.  09/06/2022 H&H 12.6/38.3 with MCV 96.2.  Platelet count 142  -     CBC Auto Differential; Future; Expected date: 09/15/2022  -     Iron and TIBC; Future;  Expected date: 09/15/2022  -     Ferritin; Future; Expected date: 09/15/2022  -     Vitamin B12; Future; Expected date: 09/15/2022  -     Folate; Future; Expected date: 09/15/2022  -     Occult blood x 1, stool; Future; Expected date: 09/15/2022  -     Occult blood x 1, stool; Future; Expected date: 09/15/2022  -     Occult blood x 1, stool; Future; Expected date: 09/15/2022    Alcohol dependence, uncomplicated; wean down daily drink to 2-3 a week.  Presently at 1 per day of 7 per week  -     Iron and TIBC; Future; Expected date: 09/15/2022  -     Ferritin; Future; Expected date: 09/15/2022  -     Vitamin B12; Future; Expected date: 09/15/2022  -     Folate; Future; Expected date: 09/15/2022    Encounter for lab test; discussed w pt and reviewed prior lab packet presented.  Collected 09/06/2022, and 5/9/22.    Malignant neoplasm of the prostate:  Sees urologist Dr. Acevedo; status post 43 x-ray tx therapy.  Finished 11/29/2021.  PSA every 6 months; 09/06/2022 PSA 0.08.  Keep follow-up with his urologist as directed and PSAs as per his urologist    Status post completion of radiation therapy for prostate cancer; 43 x-ray treatments

## 2022-09-15 NOTE — PATIENT INSTRUCTIONS
Primary hypertension: Maintain < 2 Gm Na a day diet, and monitor BP at home; keep a log. For office review.   -     CBC Auto Differential; Future; Expected date: 09/15/2022  -     Comprehensive Metabolic Panel; Future; Expected date: 09/15/2022  -     Magnesium; Future; Expected date: 09/15/2022    Other hyperlipidemia; Maintain low fat high fiber diet, exercise regularly. Weight reduction where indicated.  On crestor 20 mg a day for 4 weeks now.   -     Lipid Panel; Future; Expected date: 09/15/2022    S/P PTCA (percutaneous transluminal coronary angioplasty): keep f/u w card dr Sharif as directed. On plavix/ASA  -     CBC Auto Differential; Future; Expected date: 09/15/2022  -     Comprehensive Metabolic Panel; Future; Expected date: 09/15/2022  -     Lipid Panel; Future; Expected date: 09/15/2022  -     Magnesium; Future; Expected date: 09/15/2022  -     Hemoglobin A1C; Future; Expected date: 09/15/2022    Coronary artery disease involving native coronary artery of native heart without angina pectoris    Renal insufficiency; stop caffeine; no NSAID agents; can use tylenol for pain. Push fluids min 6-7 glasses a day; goal 7-8 glasses a day.   -     CBC Auto Differential; Future; Expected date: 09/15/2022  -     Comprehensive Metabolic Panel; Future; Expected date: 09/15/2022    Lactose intolerance; off lactose foods doing a lot better diarrhea has cleared.   -     CBC Auto Differential; Future; Expected date: 09/15/2022  -     Comprehensive Metabolic Panel; Future; Expected date: 09/15/2022    Impaired glucose metabolism; Exercise recommended with weight reduction and low carb diet; we'll follow hemoglobin A1c's with you periodically.  -     Comprehensive Metabolic Panel; Future; Expected date: 09/15/2022  -     Hemoglobin A1C; Future; Expected date: 09/15/2022    Overweight (BMI 25.0-29.9); Caloric restriction w regular exercise and weight reduction.     Normocytic anemia: Men's 50+ MVI one a day w ferrous  gluconate/fergon 324 mg a day w vit C 500 mg a day.   -     CBC Auto Differential; Future; Expected date: 09/15/2022  -     Iron and TIBC; Future; Expected date: 09/15/2022  -     Ferritin; Future; Expected date: 09/15/2022  -     Vitamin B12; Future; Expected date: 09/15/2022  -     Folate; Future; Expected date: 09/15/2022  -     Occult blood x 1, stool; Future; Expected date: 09/15/2022  -     Occult blood x 1, stool; Future; Expected date: 09/15/2022  -     Occult blood x 1, stool; Future; Expected date: 09/15/2022    Alcohol dependence, uncomplicated; wean down daily drink to 2-3 a week.   -     Iron and TIBC; Future; Expected date: 09/15/2022  -     Ferritin; Future; Expected date: 09/15/2022  -     Vitamin B12; Future; Expected date: 09/15/2022  -     Folate; Future; Expected date: 09/15/2022    Encounter for lab test; discussed ew pt and reviewed prior lab packet presented.

## 2022-09-16 ENCOUNTER — TELEPHONE (OUTPATIENT)
Dept: FAMILY MEDICINE | Facility: CLINIC | Age: 82
End: 2022-09-16
Payer: MEDICARE

## 2022-09-16 NOTE — TELEPHONE ENCOUNTER
----- Message from Gisel Holman sent at 9/16/2022 10:25 AM CDT -----  Who Called: Patient    What is the reqeust in detail: Requesting call back to discuss his stool sample instructions. Patient explained that he is unable to follow the instructions due to his existing medication regimen and he is seeking advice on how to proceed. Please advise.     Can the clinic reply by [facility]? No    Best Call Back Number: [phone]    Additional Information:

## 2022-09-20 ENCOUNTER — TELEPHONE (OUTPATIENT)
Dept: FAMILY MEDICINE | Facility: CLINIC | Age: 82
End: 2022-09-20
Payer: MEDICARE

## 2022-09-20 NOTE — TELEPHONE ENCOUNTER
----- Message from Arely Guan, Patient Care Assistant sent at 9/19/2022  9:09 AM CDT -----  Regarding: advice  Contact: pt  Type: Needs Medical Advice  Who Called:  pt   Best Call Back Number: [phone] (home) [phone] (work)    Additional Information: pt states he would like a callback regarding stopping an medication for a stool sample. Please call pt to advise. Thanks!

## 2022-09-26 ENCOUNTER — IMMUNIZATION (OUTPATIENT)
Dept: FAMILY MEDICINE | Facility: CLINIC | Age: 82
End: 2022-09-26
Payer: MEDICARE

## 2022-09-26 DIAGNOSIS — Z23 NEED FOR VACCINATION: Primary | ICD-10-CM

## 2022-09-26 PROCEDURE — 91312 COVID-19, MRNA, LNP-S, BIVALENT BOOSTER, PF, 30 MCG/0.3 ML DOSE: CPT | Mod: PBBFAC,PO

## 2022-09-26 PROCEDURE — 0124A COVID-19, MRNA, LNP-S, BIVALENT BOOSTER, PF, 30 MCG/0.3 ML DOSE: CPT | Mod: PBBFAC | Performed by: FAMILY MEDICINE

## 2022-10-02 ENCOUNTER — TELEPHONE (OUTPATIENT)
Dept: FAMILY MEDICINE | Facility: CLINIC | Age: 82
End: 2022-10-02
Payer: MEDICARE

## 2022-10-02 NOTE — TELEPHONE ENCOUNTER
Please notify patient his stool for occult blood test returned back negative.  And to obtain his labs after an overnight fast and keep his follow-up appointment to go over his lab results

## 2022-10-20 ENCOUNTER — LAB VISIT (OUTPATIENT)
Dept: LAB | Facility: HOSPITAL | Age: 82
End: 2022-10-20
Attending: INTERNAL MEDICINE
Payer: MEDICARE

## 2022-10-20 DIAGNOSIS — R73.09 IMPAIRED GLUCOSE METABOLISM: ICD-10-CM

## 2022-10-20 DIAGNOSIS — E78.49 OTHER HYPERLIPIDEMIA: ICD-10-CM

## 2022-10-20 DIAGNOSIS — F10.20 ALCOHOL DEPENDENCE, UNCOMPLICATED: ICD-10-CM

## 2022-10-20 DIAGNOSIS — N28.9 RENAL INSUFFICIENCY: ICD-10-CM

## 2022-10-20 DIAGNOSIS — E73.9 LACTOSE INTOLERANCE: ICD-10-CM

## 2022-10-20 DIAGNOSIS — Z98.61 S/P PTCA (PERCUTANEOUS TRANSLUMINAL CORONARY ANGIOPLASTY): ICD-10-CM

## 2022-10-20 DIAGNOSIS — I10 PRIMARY HYPERTENSION: ICD-10-CM

## 2022-10-20 DIAGNOSIS — D64.9 NORMOCYTIC ANEMIA: ICD-10-CM

## 2022-10-20 LAB
BASOPHILS # BLD AUTO: 0.02 K/UL (ref 0–0.2)
BASOPHILS NFR BLD: 0.4 % (ref 0–1.9)
DIFFERENTIAL METHOD: ABNORMAL
EOSINOPHIL # BLD AUTO: 0.2 K/UL (ref 0–0.5)
EOSINOPHIL NFR BLD: 4.5 % (ref 0–8)
ERYTHROCYTE [DISTWIDTH] IN BLOOD BY AUTOMATED COUNT: 12.9 % (ref 11.5–14.5)
ESTIMATED AVG GLUCOSE: 117 MG/DL (ref 68–131)
HBA1C MFR BLD: 5.7 % (ref 4–5.6)
HCT VFR BLD AUTO: 38.4 % (ref 40–54)
HGB BLD-MCNC: 12.5 G/DL (ref 14–18)
IMM GRANULOCYTES # BLD AUTO: 0.01 K/UL (ref 0–0.04)
IMM GRANULOCYTES NFR BLD AUTO: 0.2 % (ref 0–0.5)
LYMPHOCYTES # BLD AUTO: 1.2 K/UL (ref 1–4.8)
LYMPHOCYTES NFR BLD: 24.8 % (ref 18–48)
MCH RBC QN AUTO: 32.6 PG (ref 27–31)
MCHC RBC AUTO-ENTMCNC: 32.6 G/DL (ref 32–36)
MCV RBC AUTO: 100 FL (ref 82–98)
MONOCYTES # BLD AUTO: 0.6 K/UL (ref 0.3–1)
MONOCYTES NFR BLD: 13 % (ref 4–15)
NEUTROPHILS # BLD AUTO: 2.6 K/UL (ref 1.8–7.7)
NEUTROPHILS NFR BLD: 57.1 % (ref 38–73)
NRBC BLD-RTO: 0 /100 WBC
PLATELET # BLD AUTO: 132 K/UL (ref 150–450)
PMV BLD AUTO: 12.6 FL (ref 9.2–12.9)
RBC # BLD AUTO: 3.84 M/UL (ref 4.6–6.2)
WBC # BLD AUTO: 4.63 K/UL (ref 3.9–12.7)

## 2022-10-20 PROCEDURE — 82728 ASSAY OF FERRITIN: CPT | Performed by: INTERNAL MEDICINE

## 2022-10-20 PROCEDURE — 82607 VITAMIN B-12: CPT | Performed by: INTERNAL MEDICINE

## 2022-10-20 PROCEDURE — 83735 ASSAY OF MAGNESIUM: CPT | Performed by: INTERNAL MEDICINE

## 2022-10-20 PROCEDURE — 85025 COMPLETE CBC W/AUTO DIFF WBC: CPT | Performed by: INTERNAL MEDICINE

## 2022-10-20 PROCEDURE — 80061 LIPID PANEL: CPT | Performed by: INTERNAL MEDICINE

## 2022-10-20 PROCEDURE — 84466 ASSAY OF TRANSFERRIN: CPT | Performed by: INTERNAL MEDICINE

## 2022-10-20 PROCEDURE — 36415 COLL VENOUS BLD VENIPUNCTURE: CPT | Mod: PN | Performed by: INTERNAL MEDICINE

## 2022-10-20 PROCEDURE — 82746 ASSAY OF FOLIC ACID SERUM: CPT | Performed by: INTERNAL MEDICINE

## 2022-10-20 PROCEDURE — 83036 HEMOGLOBIN GLYCOSYLATED A1C: CPT | Performed by: INTERNAL MEDICINE

## 2022-10-20 PROCEDURE — 80053 COMPREHEN METABOLIC PANEL: CPT | Performed by: INTERNAL MEDICINE

## 2022-10-21 LAB
ALBUMIN SERPL BCP-MCNC: 4.1 G/DL (ref 3.5–5.2)
ALP SERPL-CCNC: 53 U/L (ref 55–135)
ALT SERPL W/O P-5'-P-CCNC: 19 U/L (ref 10–44)
ANION GAP SERPL CALC-SCNC: 9 MMOL/L (ref 8–16)
AST SERPL-CCNC: 25 U/L (ref 10–40)
BILIRUB SERPL-MCNC: 1 MG/DL (ref 0.1–1)
BUN SERPL-MCNC: 24 MG/DL (ref 8–23)
CALCIUM SERPL-MCNC: 9.1 MG/DL (ref 8.7–10.5)
CHLORIDE SERPL-SCNC: 110 MMOL/L (ref 95–110)
CHOLEST SERPL-MCNC: 110 MG/DL (ref 120–199)
CHOLEST/HDLC SERPL: 2.6 {RATIO} (ref 2–5)
CO2 SERPL-SCNC: 24 MMOL/L (ref 23–29)
CREAT SERPL-MCNC: 1.3 MG/DL (ref 0.5–1.4)
EST. GFR  (NO RACE VARIABLE): 55.2 ML/MIN/1.73 M^2
FERRITIN SERPL-MCNC: 463 NG/ML (ref 20–300)
FOLATE SERPL-MCNC: 14.6 NG/ML (ref 4–24)
GLUCOSE SERPL-MCNC: 106 MG/DL (ref 70–110)
HDLC SERPL-MCNC: 42 MG/DL (ref 40–75)
HDLC SERPL: 38.2 % (ref 20–50)
IRON SERPL-MCNC: 115 UG/DL (ref 45–160)
LDLC SERPL CALC-MCNC: 45.6 MG/DL (ref 63–159)
MAGNESIUM SERPL-MCNC: 2 MG/DL (ref 1.6–2.6)
NONHDLC SERPL-MCNC: 68 MG/DL
POTASSIUM SERPL-SCNC: 4.2 MMOL/L (ref 3.5–5.1)
PROT SERPL-MCNC: 6.8 G/DL (ref 6–8.4)
SATURATED IRON: 37 % (ref 20–50)
SODIUM SERPL-SCNC: 143 MMOL/L (ref 136–145)
TOTAL IRON BINDING CAPACITY: 312 UG/DL (ref 250–450)
TRANSFERRIN SERPL-MCNC: 211 MG/DL (ref 200–375)
TRIGL SERPL-MCNC: 112 MG/DL (ref 30–150)
VIT B12 SERPL-MCNC: 292 PG/ML (ref 210–950)

## 2022-10-26 ENCOUNTER — OFFICE VISIT (OUTPATIENT)
Dept: FAMILY MEDICINE | Facility: CLINIC | Age: 82
End: 2022-10-26
Payer: MEDICARE

## 2022-10-26 VITALS
RESPIRATION RATE: 12 BRPM | DIASTOLIC BLOOD PRESSURE: 64 MMHG | BODY MASS INDEX: 25.54 KG/M2 | WEIGHT: 204.38 LBS | SYSTOLIC BLOOD PRESSURE: 124 MMHG | HEART RATE: 55 BPM | OXYGEN SATURATION: 99 %

## 2022-10-26 DIAGNOSIS — F41.9 ANXIETY: ICD-10-CM

## 2022-10-26 DIAGNOSIS — Z85.46 HISTORY OF PROSTATE CANCER: ICD-10-CM

## 2022-10-26 DIAGNOSIS — E53.8 B12 DEFICIENCY: ICD-10-CM

## 2022-10-26 DIAGNOSIS — K21.9 GASTROESOPHAGEAL REFLUX DISEASE WITHOUT ESOPHAGITIS: Chronic | ICD-10-CM

## 2022-10-26 DIAGNOSIS — Z95.5 STATUS POST CORONARY ARTERY STENT PLACEMENT: ICD-10-CM

## 2022-10-26 DIAGNOSIS — E78.49 OTHER HYPERLIPIDEMIA: ICD-10-CM

## 2022-10-26 DIAGNOSIS — Z92.3 STATUS POST RADIATION THERAPY: ICD-10-CM

## 2022-10-26 DIAGNOSIS — I10 PRIMARY HYPERTENSION: Primary | ICD-10-CM

## 2022-10-26 DIAGNOSIS — N28.9 RENAL INSUFFICIENCY: ICD-10-CM

## 2022-10-26 DIAGNOSIS — D69.6 THROMBOCYTOPENIA: ICD-10-CM

## 2022-10-26 DIAGNOSIS — Z78.9 ALCOHOL USE: ICD-10-CM

## 2022-10-26 DIAGNOSIS — J44.9 CHRONIC OBSTRUCTIVE PULMONARY DISEASE, UNSPECIFIED COPD TYPE: Chronic | ICD-10-CM

## 2022-10-26 DIAGNOSIS — R73.09 IMPAIRED GLUCOSE METABOLISM: ICD-10-CM

## 2022-10-26 DIAGNOSIS — Z01.89 ENCOUNTER FOR LABORATORY TEST: ICD-10-CM

## 2022-10-26 DIAGNOSIS — R97.20 ELEVATED PSA: Chronic | ICD-10-CM

## 2022-10-26 DIAGNOSIS — D53.9 MACROCYTIC ANEMIA: ICD-10-CM

## 2022-10-26 PROCEDURE — 99999 PR PBB SHADOW E&M-EST. PATIENT-LVL III: ICD-10-PCS | Mod: PBBFAC,,, | Performed by: INTERNAL MEDICINE

## 2022-10-26 PROCEDURE — 99999 PR PBB SHADOW E&M-EST. PATIENT-LVL III: CPT | Mod: PBBFAC,,, | Performed by: INTERNAL MEDICINE

## 2022-10-26 PROCEDURE — 99215 PR OFFICE/OUTPT VISIT, EST, LEVL V, 40-54 MIN: ICD-10-PCS | Mod: S$PBB,,, | Performed by: INTERNAL MEDICINE

## 2022-10-26 PROCEDURE — 99215 OFFICE O/P EST HI 40 MIN: CPT | Mod: S$PBB,,, | Performed by: INTERNAL MEDICINE

## 2022-10-26 PROCEDURE — 99213 OFFICE O/P EST LOW 20 MIN: CPT | Mod: PBBFAC,PN | Performed by: INTERNAL MEDICINE

## 2022-10-26 RX ORDER — BUSPIRONE HYDROCHLORIDE 5 MG/1
TABLET ORAL
Qty: 60 TABLET | Refills: 2 | Status: SHIPPED | OUTPATIENT
Start: 2022-10-26 | End: 2023-02-07

## 2022-10-26 NOTE — PROGRESS NOTES
Subjective:       Patient ID: Jet Hernandez is a 82 y.o. male.    Chief Complaint: Follow-up  Here today for follow-up and go over his lab work  Follow-up  Pertinent negatives include no abdominal pain, arthralgias, chest pain, congestion, coughing, headaches, myalgias, nausea, rash or vomiting.   Primary hypertension: Maintain < 2 Gm Na a day diet, and monitor BP at home; keep a log. Take buspar before any office visits.  To take his blood pressure in the morning and to sit in the chair for at least 5 minutes with both feet flat on the ground before he pushes the button for his blood pressure machine to run.  Blood pressure here by me manually is 124/64  -     busPIRone (BUSPAR) 5 MG Tab; 5-7.5 mg po TID as needed for anxiety. Generic; take 1 hr before office visits.  Dispense: 60 tablet; Refill: 2    Renal insufficiency:  Recent creatinine 1.3 with GFR 55.2 slightly reduced; limit alcohol intake as well as caffeine.  No NSAIDs agents.  Can use Tylenol over-the-counter for discomfort but less than 2000 mg per 24 hours.  Needs to take in adequate fluids with a goal 6 to 8 glasses of fluid a day, stop Nexium.    Anxiety; Limit caffeine intake with stress reduction and regular exercise as tolerated.  Has been prescribed these for use as needed for anxiety  -     busPIRone (BUSPAR) 5 MG Tab; 5-7.5 mg po TID as needed for anxiety. Generic; take 1 hr before office visits.  Dispense: 60 tablet; Refill: 2    Chronic obstructive pulmonary disease, unspecified COPD type; uses rescue inhaler rarely.     Other hyperlipidemia; Maintain low fat high fiber diet, exercise regularly. Weight reduction where indicated. Cont rosuvasttn.  Lipid profile:  Cholesterol 110/triglyceride 112/HDL 42/LDL 45.6 with LDL goal less than 60.  Patient recently had 2 PTCAs with 1 coronary stent placed 2 months ago around 05/05/2022; his cardiologist is Dr. Lindo.  -     CK; Future; Expected date: 10/26/2022  -     T4, Free; Future; Expected  date: 10/26/2022  -     TSH; Future; Expected date: 10/26/2022  -     Lipid Panel; Future; Expected date: 10/26/2022  -     Comprehensive Metabolic Panel; Future; Expected date: 10/26/2022    Status post coronary artery stent placement:  Patient recently had 2 PTCAs with 1 coronary stent placement 2 months ago, 05/05/2022; cardiologist is Dr. Lindo    Gastroesophageal reflux disease without esophagitis; No bedtime snacks; weight reduction. Famotidine 20 mg an evening as needed for reflux; stop nexium.  Patient with borderline B12 deficiency as well as minimal renal insufficiency.    Elevated PSA; followed by MARY Saucedo. Keep f/u's as directed.    History of prostate cancer    Status post x-ray therapy for prostate cancer    Macrocytic anemia; Men's 50+ one a day.  B12 level returned back to 92; with folate 14.6; ferritin slightly elevated 463 serum iron normal at 115.  Start B12 at 1000 mcg a day.  -     CBC Auto Differential; Future; Expected date: 10/26/2022    B12 deficiency; start B12 at 1000 mcg a day.     Impaired glucose metabolism; Exercise recommended with weight reduction and low carb diet; we'll follow hemoglobin A1c's with you periodically.  Hemoglobin A1c 5.7 impaired fasting blood sugar 106  -     Comprehensive Metabolic Panel; Future; Expected date: 10/26/2022    Alcohol use; at 3 a week; limit use.     Thrombocytopenia:  Fully covered with recent CBC plt 132 with lower limits of normal 150; suspected from meds/alcohol; avoid other NSAID agents. Can use tylenol.  CBC with platelet count for follow-up.  Avoid any alcohol intake.      Encounter for lab test: Labs reviewed and discussed with patient at length and ordered for follow-up    Total time:  4:25 p.m. through 5:17 p.m..  Greater than 50% of the time spent in discussion, counseling, and review labs reviewed and discussed with patient in length and noted for follow-up.  Medications reviewed and addressed and prescribed.  Various different  topics/diagnosis is were discussed including plan of care.    Review of Systems   Constitutional:  Negative for appetite change and unexpected weight change.   HENT:  Negative for congestion, postnasal drip, rhinorrhea and sinus pressure.         Denies seasonal allergies, or perennial allergies   Eyes:  Negative for discharge and itching.   Respiratory:  Negative for cough, chest tightness, shortness of breath and wheezing.    Cardiovascular:  Negative for chest pain, palpitations and leg swelling.   Gastrointestinal:  Negative for abdominal pain, blood in stool, constipation, diarrhea, nausea and vomiting.   Endocrine: Negative for polydipsia, polyphagia and polyuria.   Genitourinary:  Negative for dysuria and hematuria.   Musculoskeletal:  Negative for arthralgias and myalgias.   Skin:  Negative for rash.   Allergic/Immunologic: Negative for environmental allergies and food allergies.   Neurological:  Negative for tremors, seizures and headaches.   Hematological:  Negative for adenopathy. Does not bruise/bleed easily.   Psychiatric/Behavioral:  Negative for dysphoric mood. The patient is not nervous/anxious.         Denies anxiety or depression.    Objective:        Vitals:    10/26/22 1503 10/26/22 1731   BP: (!) 160/68 124/64   Pulse: (!) 55    Resp: 12    SpO2: 99%    Weight: 92.7 kg (204 lb 5.9 oz)        BMI Readings from Last 3 Encounters:   10/26/22 25.54 kg/m²   09/15/22 25.92 kg/m²   08/04/22 25.75 kg/m²        Wt Readings from Last 3 Encounters:   10/26/22 1503 92.7 kg (204 lb 5.9 oz)   09/15/22 1508 94 kg (207 lb 5.5 oz)   08/04/22 1455 93.5 kg (206 lb 0.3 oz)        BP Readings from Last 3 Encounters:   10/26/22 124/64   09/15/22 136/72   08/04/22 125/75        There are no preventive care reminders to display for this patient.     Health Maintenance Due   Topic Date Due    Shingles Vaccine (1 of 2) Never done         Past Medical History:   Diagnosis Date    Allergy     Coronary artery disease      stent x 1    GERD (gastroesophageal reflux disease)     Hearing loss     Hyperlipidemia     Hypertension     Otitis media        Past Surgical History:   Procedure Laterality Date    CARPAL TUNNEL RELEASE  February 2012    right wrist    CHOLECYSTECTOMY  11/2007    CORONARY ANGIOPLASTY  August 2000    circumflex    CORONARY STENT PLACEMENT  July 2000    EYE SURGERY  November 2012    cataract right eye    HERNIA REPAIR      right inguinal    SPINE SURGERY  May 2005    spinal laminectomy and fusion       Social History     Tobacco Use    Smoking status: Former     Packs/day: 0.50     Years: 10.00     Pack years: 5.00     Types: Cigarettes    Smokeless tobacco: Never   Substance Use Topics    Alcohol use: Yes     Comment: 4 times per week    Drug use: No       History reviewed. No pertinent family history.    Review of patient's allergies indicates:   Allergen Reactions    No known drug allergies        Current Outpatient Medications on File Prior to Visit   Medication Sig Dispense Refill    albuterol (PROVENTIL/VENTOLIN HFA) 90 mcg/actuation inhaler INL 1 TO 2 PFS PO Q 4 TO 6 H PRF SOB OR WHZ OR CHEST TIGHTNESS 18 g 11    aspirin 81 mg Tab Take by mouth once daily.       carvediloL (COREG) 12.5 MG tablet Take 12.5 mg by mouth 2 (two) times daily.       cetirizine (ZYRTEC) 10 mg Cap Zyrtec 10 mg capsule   Take 1 capsule every day by oral route.      clopidogreL (PLAVIX) 75 mg tablet clopidogrel 75 mg tablet   Take 1 tablet every day by oral route as directed for 90 days.      esomeprazole (NEXIUM) 20 MG capsule Take 20 mg by mouth before breakfast.      fluticasone propionate (FLONASE) 50 mcg/actuation nasal spray SHAKE LIQUID AND USE 1 SPRAY(50 MCG) IN EACH NOSTRIL EVERY DAY 48 g 0    glucosamine-chondroitin 500-400 mg tablet Take 1 tablet by mouth 3 (three) times daily.      hydrochlorothiazide (MICROZIDE) 12.5 mg capsule Take 12.5 mg by mouth once daily.       ramipril (ALTACE) 10 MG capsule Take 10 mg by mouth 2  (two) times daily.       rosuvastatin 20 mg CpSP       sildenafil (REVATIO) 20 mg Tab TAKE 3 TABLETS BY MOUTH EVERY DAY AS NEEDED AS DIRECTED  5     No current facility-administered medications on file prior to visit.       Physical Exam  Vitals reviewed.   Constitutional:       Appearance: He is well-developed.   HENT:      Head: Normocephalic and atraumatic.   Neck:      Thyroid: No thyromegaly.      Vascular: No carotid bruit.   Cardiovascular:      Rate and Rhythm: Normal rate and regular rhythm.      Heart sounds: Normal heart sounds. No murmur heard.    No gallop.   Pulmonary:      Effort: Pulmonary effort is normal. No respiratory distress.      Breath sounds: Normal breath sounds. No wheezing or rales.   Abdominal:      General: Bowel sounds are normal. There is no distension.      Palpations: Abdomen is soft.      Tenderness: There is no abdominal tenderness. There is no guarding or rebound.   Musculoskeletal:         General: Normal range of motion.      Cervical back: Normal range of motion and neck supple.      Right lower leg: No edema.      Left lower leg: No edema.   Lymphadenopathy:      Cervical: No cervical adenopathy.   Skin:     Findings: No rash.   Neurological:      Mental Status: He is alert and oriented to person, place, and time.      Comments: Moves all 4 extremities fine.   Psychiatric:         Behavior: Behavior normal.         Thought Content: Thought content normal.       Assessment:       1. Primary hypertension    2. Renal insufficiency    3. Anxiety    4. Chronic obstructive pulmonary disease, unspecified COPD type    5. Other hyperlipidemia    6. Status post coronary artery stent placement    7. Gastroesophageal reflux disease without esophagitis    8. Elevated PSA    9. History of prostate cancer    10. Status post radiation therapy    11. Macrocytic anemia    12. B12 deficiency    13. Impaired glucose metabolism    14. Alcohol use    15. Thrombocytopenia    16. Encounter for  laboratory test          Plan:       Primary hypertension: Maintain < 2 Gm Na a day diet, and monitor BP at home; keep a log. Take buspar before any office visits.  To take his blood pressure in the morning and to sit in the chair for at least 5 minutes with both feet flat on the ground before he pushes the button for his blood pressure machine to run.  Blood pressure here by me manually is 124/64  -     busPIRone (BUSPAR) 5 MG Tab; 5-7.5 mg po TID as needed for anxiety. Generic; take 1 hr before office visits.  Dispense: 60 tablet; Refill: 2    Renal insufficiency:  Recent creatinine 1.3 with GFR 55.2 slightly reduced; limit alcohol intake as well as caffeine.  No NSAIDs agents.  Can use Tylenol over-the-counter for discomfort but less than 2000 mg per 24 hours.  Needs to take in adequate fluids with a goal 6 to 8 glasses of fluid a day, stop Nexium.    Anxiety; Limit caffeine intake with stress reduction and regular exercise as tolerated.  Has been prescribed these for use as needed for anxiety  -     busPIRone (BUSPAR) 5 MG Tab; 5-7.5 mg po TID as needed for anxiety. Generic; take 1 hr before office visits.  Dispense: 60 tablet; Refill: 2    Chronic obstructive pulmonary disease, unspecified COPD type; uses rescue inhaler rarely.     Other hyperlipidemia; Maintain low fat high fiber diet, exercise regularly. Weight reduction where indicated. Cont rosuvasttn.  Lipid profile:  Cholesterol 110/triglyceride 112/HDL 42/LDL 45.6 with LDL goal less than 60.  Patient recently had 2 PTCAs with 1 coronary stent placed 2 months ago around 05/05/2022; his cardiologist is Dr. Lindo.  -     CK; Future; Expected date: 10/26/2022  -     T4, Free; Future; Expected date: 10/26/2022  -     TSH; Future; Expected date: 10/26/2022  -     Lipid Panel; Future; Expected date: 10/26/2022  -     Comprehensive Metabolic Panel; Future; Expected date: 10/26/2022    Status post coronary artery stent placement:  Patient recently had 2 PTCAs  with 1 coronary stent placement 2 months ago, 05/05/2022; cardiologist is Dr. Lindo    Gastroesophageal reflux disease without esophagitis; No bedtime snacks; weight reduction. Famotidine 20 mg an evening as needed for reflux; stop nexium.  Patient with borderline B12 deficiency as well as minimal renal insufficiency.    Elevated PSA; followed by MARY Saucedo. Keep f/u's as directed.    History of prostate cancer    Status post x-ray therapy for prostate cancer    Macrocytic anemia; Men's 50+ one a day.  B12 level returned back to 92; with folate 14.6; ferritin slightly elevated 463 serum iron normal at 115.  Start B12 at 1000 mcg a day.  -     CBC Auto Differential; Future; Expected date: 10/26/2022    B12 deficiency; start B12 at 1000 mcg a day.     Impaired glucose metabolism; Exercise recommended with weight reduction and low carb diet; we'll follow hemoglobin A1c's with you periodically.  Hemoglobin A1c 5.7 impaired fasting blood sugar 106  -     Comprehensive Metabolic Panel; Future; Expected date: 10/26/2022    Alcohol use; at 3 a week; limit use.     Thrombocytopenia:  Fully covered with recent CBC plt 132 with lower limits of normal 150; suspected from meds/alcohol; avoid other NSAID agents. Can use tylenol.  CBC with platelet count for follow-up.  Avoid any alcohol intake.      Encounter for lab test: Labs reviewed and discussed with patient at length and ordered for follow-up

## 2022-10-26 NOTE — PATIENT INSTRUCTIONS
Primary hypertension: Maintain < 2 Gm Na a day diet, and monitor BP at home; keep a log. Take buspar before any office visits.   -     busPIRone (BUSPAR) 5 MG Tab; 5-7.5 mg po TID as needed for anxiety. Generic; take 1 hr before office visits.  Dispense: 60 tablet; Refill: 2    Anxiety; Limit caffeine intake with stress reduction and regular exercise as tolerated.   -     busPIRone (BUSPAR) 5 MG Tab; 5-7.5 mg po TID as needed for anxiety. Generic; take 1 hr before office visits.  Dispense: 60 tablet; Refill: 2    Chronic obstructive pulmonary disease, unspecified COPD type; uses rarely.     Other hyperlipidemia; Maintain low fat high fiber diet, exercise regularly. Weight reduction where indicated. Cont rosuvasttn  -     CK; Future; Expected date: 10/26/2022  -     T4, Free; Future; Expected date: 10/26/2022  -     TSH; Future; Expected date: 10/26/2022  -     Lipid Panel; Future; Expected date: 10/26/2022  -     Comprehensive Metabolic Panel; Future; Expected date: 10/26/2022    Gastroesophageal reflux disease without esophagitis; No bedtime snacks; weight reduction. Famotidine 20 mg an evening as needed for reflux; stop nexium.     Elevated PSA; followed by MARY Saucedo. Keep f/u's as directed.     Macrocytic anemia; Men's 50+ one a day.   -     CBC Auto Differential; Future; Expected date: 10/26/2022    B12 deficiency; start B12 at 1000 mcg a day.     Impaired glucose metabolism; Exercise recommended with weight reduction and low carb diet; we'll follow hemoglobin A1c's with you periodically.  -     Comprehensive Metabolic Panel; Future; Expected date: 10/26/2022    Alcohol use; at 3 a week; limit use.     Thrombocytopenia: suspected from meds/alcohol; avoid other NSAID agents. Can use tylenol.

## 2022-11-03 PROBLEM — Z95.5 STATUS POST CORONARY ARTERY STENT PLACEMENT: Status: ACTIVE | Noted: 2022-11-03

## 2022-11-03 PROBLEM — Z85.46 HISTORY OF PROSTATE CANCER: Status: ACTIVE | Noted: 2022-11-03

## 2022-11-03 PROBLEM — F41.9 ANXIETY: Status: ACTIVE | Noted: 2022-11-03

## 2022-11-03 PROBLEM — F10.90 ALCOHOL USE: Status: ACTIVE | Noted: 2022-11-03

## 2022-11-03 PROBLEM — Z92.3 STATUS POST RADIATION THERAPY: Status: ACTIVE | Noted: 2022-11-03

## 2022-11-03 PROBLEM — Z78.9 ALCOHOL USE: Status: ACTIVE | Noted: 2022-11-03

## 2022-11-03 PROBLEM — R73.09 IMPAIRED GLUCOSE METABOLISM: Status: ACTIVE | Noted: 2022-11-03

## 2022-11-03 PROBLEM — N28.9 RENAL INSUFFICIENCY: Status: ACTIVE | Noted: 2022-11-03

## 2022-11-03 PROBLEM — Z01.89 ENCOUNTER FOR LABORATORY TEST: Status: ACTIVE | Noted: 2022-11-03

## 2022-11-03 PROBLEM — D69.6 THROMBOCYTOPENIA: Status: ACTIVE | Noted: 2022-11-03

## 2022-11-03 PROBLEM — D53.9 MACROCYTIC ANEMIA: Status: ACTIVE | Noted: 2022-11-03

## 2022-11-03 PROBLEM — E53.8 B12 DEFICIENCY: Status: ACTIVE | Noted: 2022-11-03

## 2022-11-03 RX ORDER — FAMOTIDINE 20 MG/1
20 TABLET, FILM COATED ORAL NIGHTLY PRN
Qty: 30 TABLET | Refills: 3 | Status: SHIPPED | OUTPATIENT
Start: 2022-11-03 | End: 2023-09-26

## 2023-01-20 ENCOUNTER — OFFICE VISIT (OUTPATIENT)
Dept: OTOLARYNGOLOGY | Facility: CLINIC | Age: 83
End: 2023-01-20
Payer: MEDICARE

## 2023-01-20 VITALS — WEIGHT: 202.81 LBS | HEIGHT: 75 IN | BODY MASS INDEX: 25.22 KG/M2 | TEMPERATURE: 99 F

## 2023-01-20 DIAGNOSIS — H61.23 BILATERAL IMPACTED CERUMEN: ICD-10-CM

## 2023-01-20 DIAGNOSIS — Z97.4 WEARS HEARING AID IN BOTH EARS: Primary | ICD-10-CM

## 2023-01-20 PROCEDURE — 99499 UNLISTED E&M SERVICE: CPT | Mod: S$PBB,,, | Performed by: NURSE PRACTITIONER

## 2023-01-20 PROCEDURE — 99213 OFFICE O/P EST LOW 20 MIN: CPT | Mod: PBBFAC,25,PO | Performed by: NURSE PRACTITIONER

## 2023-01-20 PROCEDURE — 99999 PR PBB SHADOW E&M-EST. PATIENT-LVL III: CPT | Mod: PBBFAC,,, | Performed by: NURSE PRACTITIONER

## 2023-01-20 PROCEDURE — 69210 PR REMOVAL IMPACTED CERUMEN REQUIRING INSTRUMENTATION, UNILATERAL: ICD-10-PCS | Mod: S$PBB,,, | Performed by: NURSE PRACTITIONER

## 2023-01-20 PROCEDURE — 99499 NO LOS: ICD-10-PCS | Mod: S$PBB,,, | Performed by: NURSE PRACTITIONER

## 2023-01-20 PROCEDURE — 69210 REMOVE IMPACTED EAR WAX UNI: CPT | Mod: S$PBB,,, | Performed by: NURSE PRACTITIONER

## 2023-01-20 PROCEDURE — 99999 PR PBB SHADOW E&M-EST. PATIENT-LVL III: ICD-10-PCS | Mod: PBBFAC,,, | Performed by: NURSE PRACTITIONER

## 2023-01-20 PROCEDURE — 69210 REMOVE IMPACTED EAR WAX UNI: CPT | Mod: PBBFAC,PO | Performed by: NURSE PRACTITIONER

## 2023-01-20 NOTE — PROGRESS NOTES
Subjective:       Patient ID: Jet Hernandez is a 82 y.o. male.    Chief Complaint: No chief complaint on file.    HPI  Patient last seen by me approx 1.5 years ago. He wears hearing aids through Dr. Jennings at [facility] in [place]. He would like cerumen removed to improve his hearing aid use. He denies otalgia or otorrhea.  He denies any other ENT symptoms or concerns at present.     Review of Systems   Constitutional: Negative.  Negative for fatigue and fever.   HENT:  Positive for hearing loss (wears hearing aids AU) and postnasal drip. Negative for ear discharge.    Eyes: Negative.    Respiratory: Negative.  Negative for cough, shortness of breath and wheezing.    Cardiovascular: Negative.    Gastrointestinal: Negative.    Endocrine: Negative.    Genitourinary: Negative.    Musculoskeletal: Negative.  Negative for neck pain.   Skin: Negative.  Negative for pallor and rash.   Neurological: Negative.    Hematological:  Bruises/bleeds easily.   Psychiatric/Behavioral: Negative.       Objective:      Physical Exam  Vitals and nursing note reviewed.   Constitutional:       General: He is not in acute distress.     Appearance: He is well-developed. He is not ill-appearing or diaphoretic.   HENT:      Head: Normocephalic and atraumatic.      Right Ear: Tympanic membrane, ear canal and external ear normal. No drainage. No middle ear effusion. Tympanic membrane is not erythematous.      Left Ear: Tympanic membrane, ear canal and external ear normal. No drainage.  No middle ear effusion. Tympanic membrane is not erythematous.   Eyes:      General: No scleral icterus.        Right eye: No discharge.         Left eye: No discharge.   Neck:      Trachea: Trachea normal. No tracheal deviation.   Pulmonary:      Effort: Pulmonary effort is normal. No respiratory distress.      Breath sounds: No wheezing.   Musculoskeletal:         General: Normal range of motion.      Cervical back: Normal range of motion and  neck supple.   Skin:     General: Skin is warm and dry.      Coloration: Skin is not pale.      Findings: No erythema, lesion or rash.   Neurological:      Mental Status: He is alert and oriented to person, place, and time.   Psychiatric:         Speech: Speech normal.         Behavior: Behavior normal. Behavior is cooperative.         Thought Content: Thought content normal.         Judgment: Judgment normal.      SEPARATE PROCEDURE IN OFFICE:   Procedure: Removal of impacted cerumen, bilateral   Pre Procedure Diagnosis: Cerumen Impaction   Post Procedure Diagnosis: Cerumen Impaction   Verbal informed consent in regards to risk of trauma to ear canal, ear drum or hearing, discomfort during procedure and/or inability to remove cerumen impaction in one session or unforeseen events or complications.   No anesthesia.     Procedure in detail:   Ear canal visualized bilateral with appropriate size ear speculum utilizing Operating Head Binocular Otomicroscope   Utilizing the following:  Suction cannula was used. The impacted cerumen of the ear canals was removed atraumatically. The TM and EAC were then inspected and found to be clear of wax. See description of TMs/EACs in PE above.   Complications: No   Condition: Improved/Good    Assessment:     Cerumen impactions removed    Hearing loss, wears hearing aids AU  Plan:      Patient states he would like to have his ears cleaned every 7-8 months.      Return as needed for further ENT concerns

## 2023-01-31 ENCOUNTER — LAB VISIT (OUTPATIENT)
Dept: LAB | Facility: HOSPITAL | Age: 83
End: 2023-01-31
Attending: INTERNAL MEDICINE
Payer: MEDICARE

## 2023-01-31 DIAGNOSIS — R73.09 IMPAIRED GLUCOSE METABOLISM: ICD-10-CM

## 2023-01-31 DIAGNOSIS — E78.49 OTHER HYPERLIPIDEMIA: ICD-10-CM

## 2023-01-31 DIAGNOSIS — D53.9 MACROCYTIC ANEMIA: ICD-10-CM

## 2023-01-31 LAB
ALBUMIN SERPL BCP-MCNC: 3.9 G/DL (ref 3.5–5.2)
ALP SERPL-CCNC: 60 U/L (ref 55–135)
ALT SERPL W/O P-5'-P-CCNC: 19 U/L (ref 10–44)
ANION GAP SERPL CALC-SCNC: 13 MMOL/L (ref 8–16)
AST SERPL-CCNC: 25 U/L (ref 10–40)
BASOPHILS # BLD AUTO: 0.02 K/UL (ref 0–0.2)
BASOPHILS NFR BLD: 0.3 % (ref 0–1.9)
BILIRUB SERPL-MCNC: 0.5 MG/DL (ref 0.1–1)
BUN SERPL-MCNC: 21 MG/DL (ref 8–23)
CALCIUM SERPL-MCNC: 9.2 MG/DL (ref 8.7–10.5)
CHLORIDE SERPL-SCNC: 106 MMOL/L (ref 95–110)
CHOLEST SERPL-MCNC: 107 MG/DL (ref 120–199)
CHOLEST/HDLC SERPL: 2.7 {RATIO} (ref 2–5)
CK SERPL-CCNC: 1466 U/L (ref 20–200)
CO2 SERPL-SCNC: 22 MMOL/L (ref 23–29)
CREAT SERPL-MCNC: 1.2 MG/DL (ref 0.5–1.4)
DIFFERENTIAL METHOD: ABNORMAL
EOSINOPHIL # BLD AUTO: 0.2 K/UL (ref 0–0.5)
EOSINOPHIL NFR BLD: 3.6 % (ref 0–8)
ERYTHROCYTE [DISTWIDTH] IN BLOOD BY AUTOMATED COUNT: 13 % (ref 11.5–14.5)
EST. GFR  (NO RACE VARIABLE): >60 ML/MIN/1.73 M^2
GLUCOSE SERPL-MCNC: 126 MG/DL (ref 70–110)
HCT VFR BLD AUTO: 38.6 % (ref 40–54)
HDLC SERPL-MCNC: 39 MG/DL (ref 40–75)
HDLC SERPL: 36.4 % (ref 20–50)
HGB BLD-MCNC: 12.7 G/DL (ref 14–18)
IMM GRANULOCYTES # BLD AUTO: 0.01 K/UL (ref 0–0.04)
IMM GRANULOCYTES NFR BLD AUTO: 0.2 % (ref 0–0.5)
LDLC SERPL CALC-MCNC: 51 MG/DL (ref 63–159)
LYMPHOCYTES # BLD AUTO: 1 K/UL (ref 1–4.8)
LYMPHOCYTES NFR BLD: 15.5 % (ref 18–48)
MCH RBC QN AUTO: 32.8 PG (ref 27–31)
MCHC RBC AUTO-ENTMCNC: 32.9 G/DL (ref 32–36)
MCV RBC AUTO: 100 FL (ref 82–98)
MONOCYTES # BLD AUTO: 1.2 K/UL (ref 0.3–1)
MONOCYTES NFR BLD: 18.9 % (ref 4–15)
NEUTROPHILS # BLD AUTO: 3.8 K/UL (ref 1.8–7.7)
NEUTROPHILS NFR BLD: 61.5 % (ref 38–73)
NONHDLC SERPL-MCNC: 68 MG/DL
NRBC BLD-RTO: 0 /100 WBC
PLATELET # BLD AUTO: 126 K/UL (ref 150–450)
PMV BLD AUTO: 12.6 FL (ref 9.2–12.9)
POTASSIUM SERPL-SCNC: 4.1 MMOL/L (ref 3.5–5.1)
PROT SERPL-MCNC: 6.8 G/DL (ref 6–8.4)
RBC # BLD AUTO: 3.87 M/UL (ref 4.6–6.2)
SODIUM SERPL-SCNC: 141 MMOL/L (ref 136–145)
T4 FREE SERPL-MCNC: 0.85 NG/DL (ref 0.71–1.51)
TRIGL SERPL-MCNC: 85 MG/DL (ref 30–150)
TSH SERPL DL<=0.005 MIU/L-ACNC: 3.1 UIU/ML (ref 0.4–4)
WBC # BLD AUTO: 6.19 K/UL (ref 3.9–12.7)

## 2023-01-31 PROCEDURE — 84443 ASSAY THYROID STIM HORMONE: CPT | Performed by: INTERNAL MEDICINE

## 2023-01-31 PROCEDURE — 84439 ASSAY OF FREE THYROXINE: CPT | Performed by: INTERNAL MEDICINE

## 2023-01-31 PROCEDURE — 80061 LIPID PANEL: CPT | Performed by: INTERNAL MEDICINE

## 2023-01-31 PROCEDURE — 85025 COMPLETE CBC W/AUTO DIFF WBC: CPT | Performed by: INTERNAL MEDICINE

## 2023-01-31 PROCEDURE — 80053 COMPREHEN METABOLIC PANEL: CPT | Performed by: INTERNAL MEDICINE

## 2023-01-31 PROCEDURE — 36415 COLL VENOUS BLD VENIPUNCTURE: CPT | Mod: PN | Performed by: INTERNAL MEDICINE

## 2023-01-31 PROCEDURE — 82550 ASSAY OF CK (CPK): CPT | Performed by: INTERNAL MEDICINE

## 2023-02-03 ENCOUNTER — TELEPHONE (OUTPATIENT)
Dept: FAMILY MEDICINE | Facility: CLINIC | Age: 83
End: 2023-02-03
Payer: MEDICARE

## 2023-02-03 NOTE — TELEPHONE ENCOUNTER
----- Message from Meron Ferguson sent at 2/3/2023  9:28 AM CST -----  Name of Who is Calling:YARON ALVAREZ [[id number]]       What is the request in detail: pt stated that his cardiologist is needing some info to send something over. Please advise       Can the clinic reply by [facility]: Yes        [facility][phone]

## 2023-02-07 ENCOUNTER — OFFICE VISIT (OUTPATIENT)
Dept: FAMILY MEDICINE | Facility: CLINIC | Age: 83
End: 2023-02-07
Payer: MEDICARE

## 2023-02-07 VITALS
DIASTOLIC BLOOD PRESSURE: 70 MMHG | HEART RATE: 65 BPM | OXYGEN SATURATION: 98 % | SYSTOLIC BLOOD PRESSURE: 138 MMHG | WEIGHT: 208.31 LBS | BODY MASS INDEX: 25.9 KG/M2 | HEIGHT: 75 IN

## 2023-02-07 DIAGNOSIS — E78.00 PURE HYPERCHOLESTEROLEMIA: ICD-10-CM

## 2023-02-07 DIAGNOSIS — I25.10 CORONARY ARTERY DISEASE INVOLVING NATIVE CORONARY ARTERY OF NATIVE HEART WITHOUT ANGINA PECTORIS: Primary | ICD-10-CM

## 2023-02-07 DIAGNOSIS — R74.8 ELEVATED CPK: ICD-10-CM

## 2023-02-07 DIAGNOSIS — M79.10 MYALGIA: ICD-10-CM

## 2023-02-07 DIAGNOSIS — T88.7XXA MEDICATION SIDE EFFECT: ICD-10-CM

## 2023-02-07 PROCEDURE — 99999 PR PBB SHADOW E&M-EST. PATIENT-LVL III: CPT | Mod: PBBFAC,,, | Performed by: PHYSICIAN ASSISTANT

## 2023-02-07 PROCEDURE — 99213 OFFICE O/P EST LOW 20 MIN: CPT | Mod: S$PBB,,, | Performed by: PHYSICIAN ASSISTANT

## 2023-02-07 PROCEDURE — 99999 PR PBB SHADOW E&M-EST. PATIENT-LVL III: ICD-10-PCS | Mod: PBBFAC,,, | Performed by: PHYSICIAN ASSISTANT

## 2023-02-07 PROCEDURE — 99213 PR OFFICE/OUTPT VISIT, EST, LEVL III, 20-29 MIN: ICD-10-PCS | Mod: S$PBB,,, | Performed by: PHYSICIAN ASSISTANT

## 2023-02-07 PROCEDURE — 99213 OFFICE O/P EST LOW 20 MIN: CPT | Mod: PBBFAC,PN | Performed by: PHYSICIAN ASSISTANT

## 2023-02-07 NOTE — PROGRESS NOTES
Subjective:       Patient ID: Jet Hernandez is a 82 y.o. male.    Chief Complaint: Follow-up (Lab work)    HPI  F/u on labs   Started Crestor 3 mos ago  Muscle weakness   Elevated CPK  Pt. Has prior muscle weakness hx from statin meds   Wants to reduce Crestor to 10 mg daily  Review of Systems   Constitutional:  Positive for activity change and fatigue. Negative for appetite change, chills, diaphoresis, fever and unexpected weight change.   HENT: Negative.     Eyes: Negative.    Respiratory: Negative.  Negative for cough and shortness of breath.    Cardiovascular: Negative.  Negative for chest pain and leg swelling.   Gastrointestinal: Negative.    Endocrine: Negative.    Genitourinary: Negative.    Musculoskeletal:  Positive for leg pain and myalgias.   Integumentary:  Negative for rash. Negative.   Neurological:  Positive for weakness.       Objective:      Physical Exam  Constitutional:       General: He is not in acute distress.     Appearance: Normal appearance. He is normal weight. He is not ill-appearing, toxic-appearing or diaphoretic.   HENT:      Head: Normocephalic and atraumatic.   Eyes:      General: No scleral icterus.     Conjunctiva/sclera: Conjunctivae normal.   Skin:     General: Skin is warm and dry.      Findings: No rash.   Neurological:      General: No focal deficit present.      Mental Status: He is alert and oriented to person, place, and time.       Assessment:       Problem List Items Addressed This Visit       Coronary artery disease involving native coronary artery without angina pectoris - Primary    Hyperlipidemia    Myalgia    Elevated CPK    Medication side effect       Plan:       Jet was seen today for follow-up.    Diagnoses and all orders for this visit:    Coronary artery disease involving native coronary artery of native heart without angina pectoris    Myalgia    Elevated CPK    Medication side effect    Pure hypercholesterolemia     Discussed otc's  F/u check 3  mos  Reduce Crestor to 10 mg daily

## 2023-02-08 DIAGNOSIS — E78.00 PURE HYPERCHOLESTEROLEMIA: Primary | ICD-10-CM

## 2023-02-08 DIAGNOSIS — I25.10 CORONARY ARTERY DISEASE INVOLVING NATIVE CORONARY ARTERY OF NATIVE HEART WITHOUT ANGINA PECTORIS: ICD-10-CM

## 2023-02-08 RX ORDER — ROSUVASTATIN CALCIUM 10 MG/1
10 TABLET, COATED ORAL DAILY
Qty: 90 TABLET | Refills: 1 | Status: SHIPPED | OUTPATIENT
Start: 2023-02-08 | End: 2023-07-25

## 2023-02-24 ENCOUNTER — TELEPHONE (OUTPATIENT)
Dept: FAMILY MEDICINE | Facility: CLINIC | Age: 83
End: 2023-02-24
Payer: MEDICARE

## 2023-02-24 DIAGNOSIS — E53.8 B12 DEFICIENCY: Primary | ICD-10-CM

## 2023-02-24 DIAGNOSIS — D53.9 MACROCYTIC ANEMIA: ICD-10-CM

## 2023-02-24 RX ORDER — LANOLIN ALCOHOL/MO/W.PET/CERES
CREAM (GRAM) TOPICAL
Qty: 90 TABLET | Refills: 2 | Status: SHIPPED | OUTPATIENT
Start: 2023-02-24

## 2023-02-24 NOTE — TELEPHONE ENCOUNTER
Please call patient and notify him that B12 at 1000 mcg 1 p.o. daily number 90 with 1 refill was sent into his [facility] pharmacy for him thank you

## 2023-03-03 ENCOUNTER — LAB VISIT (OUTPATIENT)
Dept: LAB | Facility: HOSPITAL | Age: 83
End: 2023-03-03
Attending: INTERNAL MEDICINE
Payer: MEDICARE

## 2023-03-03 DIAGNOSIS — R35.0 URINARY FREQUENCY: ICD-10-CM

## 2023-03-03 DIAGNOSIS — C61 MALIGNANT NEOPLASM OF PROSTATE: Primary | ICD-10-CM

## 2023-03-03 DIAGNOSIS — R39.15 URGENCY OF URINATION: ICD-10-CM

## 2023-03-03 PROCEDURE — 84153 ASSAY OF PSA TOTAL: CPT | Performed by: SPECIALIST

## 2023-03-03 PROCEDURE — 36415 COLL VENOUS BLD VENIPUNCTURE: CPT | Mod: PN | Performed by: SPECIALIST

## 2023-03-04 LAB — COMPLEXED PSA SERPL-MCNC: 0.08 NG/ML (ref 0–4)

## 2023-04-20 ENCOUNTER — LAB VISIT (OUTPATIENT)
Dept: LAB | Facility: HOSPITAL | Age: 83
End: 2023-04-20
Attending: INTERNAL MEDICINE
Payer: MEDICARE

## 2023-04-20 DIAGNOSIS — E78.5 HYPERLIPIDEMIA: Primary | ICD-10-CM

## 2023-04-20 LAB
ALBUMIN SERPL BCP-MCNC: 3.8 G/DL (ref 3.5–5.2)
ALP SERPL-CCNC: 61 U/L (ref 55–135)
ALT SERPL W/O P-5'-P-CCNC: 23 U/L (ref 10–44)
ANION GAP SERPL CALC-SCNC: 7 MMOL/L (ref 8–16)
AST SERPL-CCNC: 28 U/L (ref 10–40)
BASOPHILS # BLD AUTO: 0.02 K/UL (ref 0–0.2)
BASOPHILS NFR BLD: 0.4 % (ref 0–1.9)
BILIRUB SERPL-MCNC: 0.6 MG/DL (ref 0.1–1)
BUN SERPL-MCNC: 23 MG/DL (ref 8–23)
CALCIUM SERPL-MCNC: 9.1 MG/DL (ref 8.7–10.5)
CHLORIDE SERPL-SCNC: 109 MMOL/L (ref 95–110)
CHOLEST SERPL-MCNC: 121 MG/DL (ref 120–199)
CHOLEST/HDLC SERPL: 2.9 {RATIO} (ref 2–5)
CO2 SERPL-SCNC: 26 MMOL/L (ref 23–29)
CREAT SERPL-MCNC: 1.2 MG/DL (ref 0.5–1.4)
DIFFERENTIAL METHOD: ABNORMAL
EOSINOPHIL # BLD AUTO: 0.2 K/UL (ref 0–0.5)
EOSINOPHIL NFR BLD: 4.4 % (ref 0–8)
ERYTHROCYTE [DISTWIDTH] IN BLOOD BY AUTOMATED COUNT: 12.8 % (ref 11.5–14.5)
EST. GFR  (NO RACE VARIABLE): >60 ML/MIN/1.73 M^2
GLUCOSE SERPL-MCNC: 115 MG/DL (ref 70–110)
HCT VFR BLD AUTO: 38.7 % (ref 40–54)
HDLC SERPL-MCNC: 42 MG/DL (ref 40–75)
HDLC SERPL: 34.7 % (ref 20–50)
HGB BLD-MCNC: 13 G/DL (ref 14–18)
IMM GRANULOCYTES # BLD AUTO: 0.01 K/UL (ref 0–0.04)
IMM GRANULOCYTES NFR BLD AUTO: 0.2 % (ref 0–0.5)
LDLC SERPL CALC-MCNC: 60.4 MG/DL (ref 63–159)
LYMPHOCYTES # BLD AUTO: 1.1 K/UL (ref 1–4.8)
LYMPHOCYTES NFR BLD: 20.9 % (ref 18–48)
MCH RBC QN AUTO: 33.1 PG (ref 27–31)
MCHC RBC AUTO-ENTMCNC: 33.6 G/DL (ref 32–36)
MCV RBC AUTO: 99 FL (ref 82–98)
MONOCYTES # BLD AUTO: 0.6 K/UL (ref 0.3–1)
MONOCYTES NFR BLD: 12.3 % (ref 4–15)
NEUTROPHILS # BLD AUTO: 3.2 K/UL (ref 1.8–7.7)
NEUTROPHILS NFR BLD: 61.8 % (ref 38–73)
NONHDLC SERPL-MCNC: 79 MG/DL
NRBC BLD-RTO: 0 /100 WBC
PLATELET # BLD AUTO: 138 K/UL (ref 150–450)
PMV BLD AUTO: 12.7 FL (ref 9.2–12.9)
POTASSIUM SERPL-SCNC: 4.1 MMOL/L (ref 3.5–5.1)
PROT SERPL-MCNC: 6.5 G/DL (ref 6–8.4)
RBC # BLD AUTO: 3.93 M/UL (ref 4.6–6.2)
SODIUM SERPL-SCNC: 142 MMOL/L (ref 136–145)
TRIGL SERPL-MCNC: 93 MG/DL (ref 30–150)
WBC # BLD AUTO: 5.21 K/UL (ref 3.9–12.7)

## 2023-04-20 PROCEDURE — 80061 LIPID PANEL: CPT | Performed by: INTERNAL MEDICINE

## 2023-04-20 PROCEDURE — 85025 COMPLETE CBC W/AUTO DIFF WBC: CPT | Performed by: INTERNAL MEDICINE

## 2023-04-20 PROCEDURE — 36415 COLL VENOUS BLD VENIPUNCTURE: CPT | Mod: PN | Performed by: INTERNAL MEDICINE

## 2023-04-20 PROCEDURE — 80053 COMPREHEN METABOLIC PANEL: CPT | Performed by: INTERNAL MEDICINE

## 2023-04-26 ENCOUNTER — TELEPHONE (OUTPATIENT)
Dept: FAMILY MEDICINE | Facility: CLINIC | Age: 83
End: 2023-04-26
Payer: MEDICARE

## 2023-05-03 ENCOUNTER — PATIENT MESSAGE (OUTPATIENT)
Dept: FAMILY MEDICINE | Facility: CLINIC | Age: 83
End: 2023-05-03
Payer: MEDICARE

## 2023-05-03 DIAGNOSIS — D53.9 MACROCYTIC ANEMIA: ICD-10-CM

## 2023-05-03 DIAGNOSIS — R74.8 ELEVATED CPK: Primary | ICD-10-CM

## 2023-05-03 DIAGNOSIS — M79.10 MYALGIA: ICD-10-CM

## 2023-05-03 DIAGNOSIS — E78.00 PURE HYPERCHOLESTEROLEMIA: ICD-10-CM

## 2023-05-03 DIAGNOSIS — D69.6 THROMBOCYTOPENIA: ICD-10-CM

## 2023-05-03 NOTE — TELEPHONE ENCOUNTER
Please tell patient I have reviewed his chart and hope the reduction in Crestor has helped him.  Would like him to schedule a follow-up appointment somewhere in the next 2 months with labs 1 week prior after an overnight fast.  They have been ordered for him.  Limit alcohol intake and caffeine.  Push fluids minimum 6-8 glasses of fluid a day to help your kidney function.  Take a men's 50+ multivitamin once daily.

## 2023-05-04 NOTE — TELEPHONE ENCOUNTER
Spoke with patient and asked patient how he was doing on Crestor with it being reduce. He stated he is doing well and explain to him what  wanted him to do and he is doing all that was recommenced. Patient stated he will call back to schedule appointment and labs

## 2023-06-12 ENCOUNTER — TELEPHONE (OUTPATIENT)
Dept: FAMILY MEDICINE | Facility: CLINIC | Age: 83
End: 2023-06-12
Payer: MEDICARE

## 2023-06-12 NOTE — TELEPHONE ENCOUNTER
----- Message from Shelley Peña sent at 6/12/2023  7:27 AM CDT -----  Type:  Same Day Appointment Request    Caller is requesting a same day appointment.  Caller declined first available appointment listed below.      Name of Caller:  pt wife, Marilee  When is the first available appointment?  none  Symptoms:  RSV  Best Call Back Number:  [phone] (home) [phone] (work)  Additional Information:   please call and advise--thank you

## 2023-06-12 NOTE — TELEPHONE ENCOUNTER
Pt went to ER last week with concerns that he has RSV . Pt has had RSV in the past and does not want it to go untreated if that is what he has .   Pt has had cough x 1 week . It is productive cough. Pt denies fever . Denies SOB. Pt reports sinus congestion x a few weeks . Pt taking Robitussin cough syrup and takes Albuterol inhaler as needed . Pt has been using inhaler 2x daily the past week . Pt reports wheezing comes and goes . He was not wheezing when he went to ER . Pt wants to be tested for RSV .  Advised pt we do not perform RSV testing.  Offered pt to be seen at [facility] and if provider feels swab is necessary m, they can swab and send to the lab but it will not be POCT result . Pt can call urgent care clinics and see if they perform RSV testing . Pt understands .--lp

## 2023-06-19 ENCOUNTER — TELEPHONE (OUTPATIENT)
Dept: FAMILY MEDICINE | Facility: CLINIC | Age: 83
End: 2023-06-19
Payer: MEDICARE

## 2023-06-19 NOTE — TELEPHONE ENCOUNTER
Pt noticed that he has some blood in his urine x 4-5 days . Pt states that it is a small amount of blood . It is not present every time he urinates. Pt denies pain , reports temp 98.1 in the evenings . Appt sched ahsan dangelo/ [facility].--lp

## 2023-06-19 NOTE — TELEPHONE ENCOUNTER
----- Message from Suzy Hogan sent at 6/19/2023  8:14 AM CDT -----  Regarding: lab ordrs  Type: Needs Medical Advice  Who Called:  pt  Symptoms (please be specific):  blood in urine   How long has patient had these symptoms:  few days  Pharmacy name and phone #:    Best Call Back Number: [phone]    Additional Information: pt states that he has had blood in his urine for a few days in would like for the doctor to order an uti labs at the  [facility]. Please give pt a call to discuss.

## 2023-06-20 ENCOUNTER — OFFICE VISIT (OUTPATIENT)
Dept: FAMILY MEDICINE | Facility: CLINIC | Age: 83
End: 2023-06-20
Payer: MEDICARE

## 2023-06-20 VITALS
SYSTOLIC BLOOD PRESSURE: 156 MMHG | DIASTOLIC BLOOD PRESSURE: 80 MMHG | HEART RATE: 61 BPM | WEIGHT: 200.81 LBS | OXYGEN SATURATION: 98 % | HEIGHT: 75 IN | BODY MASS INDEX: 24.97 KG/M2

## 2023-06-20 DIAGNOSIS — J44.9 CHRONIC OBSTRUCTIVE PULMONARY DISEASE, UNSPECIFIED COPD TYPE: ICD-10-CM

## 2023-06-20 DIAGNOSIS — F10.20 ALCOHOL DEPENDENCE, UNCOMPLICATED: ICD-10-CM

## 2023-06-20 DIAGNOSIS — R31.0 GROSS HEMATURIA: Primary | ICD-10-CM

## 2023-06-20 DIAGNOSIS — D69.6 THROMBOCYTOPENIA: ICD-10-CM

## 2023-06-20 DIAGNOSIS — I10 PRIMARY HYPERTENSION: ICD-10-CM

## 2023-06-20 PROCEDURE — 88112 CYTOPATH CELL ENHANCE TECH: CPT | Performed by: PATHOLOGY

## 2023-06-20 PROCEDURE — 99214 PR OFFICE/OUTPT VISIT, EST, LEVL IV, 30-39 MIN: ICD-10-PCS | Mod: S$PBB,,,

## 2023-06-20 PROCEDURE — 88112 CYTOPATH CELL ENHANCE TECH: CPT | Mod: 26,,, | Performed by: PATHOLOGY

## 2023-06-20 PROCEDURE — 99215 OFFICE O/P EST HI 40 MIN: CPT | Mod: PBBFAC,PN

## 2023-06-20 PROCEDURE — 99214 OFFICE O/P EST MOD 30 MIN: CPT | Mod: S$PBB,,,

## 2023-06-20 PROCEDURE — 88112 PR  CYTOPATH, CELL ENHANCE TECH: ICD-10-PCS | Mod: 26,,, | Performed by: PATHOLOGY

## 2023-06-20 PROCEDURE — 99999 PR PBB SHADOW E&M-EST. PATIENT-LVL V: CPT | Mod: PBBFAC,,,

## 2023-06-20 PROCEDURE — 99999 PR PBB SHADOW E&M-EST. PATIENT-LVL V: ICD-10-PCS | Mod: PBBFAC,,,

## 2023-06-20 NOTE — PROGRESS NOTES
[facility]  [address]  [place] [address]    Subjective    Chief Complaint:   Chief Complaint   Patient presents with    Follow-up     Blood in urine started 1 week ago Sunday        History of Present Illness:     Jet Hernandez is a(n) 83 y.o. male with past medical history as noted below who presents to the clinic today for 1 week onset gross hematuria.  Is present with his wife.    About a week ago noticed urine was tinged pink with blood. He denies urgency, frequency, dysuria, new low back pain, fever.  Has never had this happen before.     Note blood pressure today initially 180/70--> 156/80 with my manual recheck.  Blood pressure was also significantly elevated during recent urgent care visit:  192/68.  Current regimen includes ramipril 10 mg b.i.d. and Coreg 12.5 mg b.i.d..  He does take HCTZ daily but has been using on a as needed basis for elevated blood pressure-- did take today.  He reports a long history of white coat hypertension and that blood pressure is usually in 130s over 70s at home.  No chest pain or shortness of breath reported.       Problem List:   Patient Active Problem List   Diagnosis    Hypertension    Coronary artery disease involving native coronary artery without angina pectoris    Arthritis of lumbar spine    COPD (chronic obstructive pulmonary disease)    Gastroesophageal reflux disease without esophagitis    History of colon polyps    Elevated PSA    Hyperlipidemia    Renal insufficiency    Anxiety    Status post coronary artery stent placement    History of prostate cancer    Status post radiation therapy    Macrocytic anemia    B12 deficiency    Impaired glucose metabolism    Alcohol use    Thrombocytopenia    Encounter for laboratory test    Myalgia    Elevated CPK    Medication side effect    Alcohol dependence, uncomplicated       Current Outpatient Medications:   Current Outpatient Medications   Medication  Instructions    albuterol (PROVENTIL/VENTOLIN HFA) 90 mcg/actuation inhaler INL 1 TO 2 PFS PO Q 4 TO 6 H PRF SOB OR WHZ OR CHEST TIGHTNESS    aspirin 81 mg Tab Daily    carvediloL (COREG) 12.5 mg, 2 times daily    cetirizine (ZYRTEC) 10 mg Cap Zyrtec 10 mg capsule   Take 1 capsule every day by oral route.    clopidogreL (PLAVIX) 75 mg tablet clopidogrel 75 mg tablet   Take 1 tablet every day by oral route as directed for 90 days.    cyanocobalamin (VITAMIN B-12) 1000 MCG tablet Take 1000 mcg of B12 p.o. daily; please provide generic    famotidine (PEPCID) 20 mg, Oral, Nightly PRN, or reflux. Generic please. Stop nexium/esomeprazole    fluticasone propionate (FLONASE) 50 mcg/actuation nasal spray SHAKE LIQUID AND USE 1 SPRAY(50 MCG) IN EACH NOSTRIL EVERY DAY    glucosamine-chondroitin 500-400 mg tablet 1 tablet, Oral, 3 times daily    hydroCHLOROthiazide (MICROZIDE) 12.5 mg, Daily    ramipriL (ALTACE) 10 mg, Oral, 2 times daily    rosuvastatin (CRESTOR) 10 mg, Oral, Daily    sildenafil (REVATIO) 20 mg Tab TAKE 3 TABLETS BY MOUTH EVERY DAY AS NEEDED AS DIRECTED       Surgical History:   Past Surgical History:   Procedure Laterality Date    CARPAL TUNNEL RELEASE  February 2012    right wrist    CHOLECYSTECTOMY  11/2007    CORONARY ANGIOPLASTY  August 2000    circumflex    CORONARY STENT PLACEMENT  July 2000    EYE SURGERY  November 2012    cataract right eye    HERNIA REPAIR      right inguinal    SPINE SURGERY  May 2005    spinal laminectomy and fusion       Family History: No family history on file.    Allergies:   Review of patient's allergies indicates:   Allergen Reactions    No known drug allergies        Tobacco Status:   Tobacco Use: Medium Risk    Smoking Tobacco Use: Former    Smokeless Tobacco Use: Never    Passive Exposure: Not on file       Sexual Activity:   Social History     Substance and Sexual Activity   Sexual Activity Not on file       Alcohol Use:   Social History     Substance and Sexual Activity  "  Alcohol Use Yes    Comment: 4 times per week         Objective       Vitals:    06/20/23 1353 06/20/23 1428   BP: (!) 180/70 (!) 156/80   Pulse: 61    SpO2: 98%    Weight: 91.1 kg (200 lb 13.4 oz)    Height: 6' 3" (1.905 m)        Review of Systems   Constitutional:  Negative for chills and fever.   Eyes: Negative.    Respiratory:  Negative for cough and shortness of breath.    Cardiovascular:  Negative for chest pain.   Gastrointestinal:  Negative for abdominal pain.   Genitourinary:  Positive for hematuria. Negative for dysuria, flank pain, frequency and urgency.   Neurological:  Negative for dizziness and headaches.     Physical Exam  Constitutional:       General: He is not in acute distress.     Appearance: Normal appearance.   HENT:      Head: Normocephalic and atraumatic.   Cardiovascular:      Rate and Rhythm: Normal rate and regular rhythm.      Heart sounds: Normal heart sounds. No murmur heard.  Pulmonary:      Effort: Pulmonary effort is normal. No respiratory distress.      Breath sounds: Normal breath sounds. No wheezing.   Abdominal:      Tenderness: There is no right CVA tenderness or left CVA tenderness.   Skin:     General: Skin is warm.   Neurological:      Mental Status: He is alert and oriented to person, place, and time.   Psychiatric:         Behavior: Behavior normal.         Assessment and Plan:    1. Gross hematuria  -     Urine culture; Future; Expected date: 06/20/2023  -     Urinalysis Microscopic; Future; Expected date: 06/20/2023  -     Cytology, Urine  -     Ambulatory referral/consult to Urology; Future; Expected date: 06/27/2023    2. Alcohol dependence, uncomplicated    3. Thrombocytopenia    4. Chronic obstructive pulmonary disease, unspecified COPD type    5. Primary hypertension  Overview:  Followed by Dr. Jason Lott.           Visit summary:    Jet Hernandez presented today for hematuria.    Confirmed with urinalysis.  In absence of other signs and symptoms of urinary " tract infection, will hold off on treating as urinary tract infection and send urine off for culture, cytology and microscopic analysis.  Also placed referral to Urology.    Blood pressure elevated today.  Recommending checking blood pressure upon returning home.  If persistently elevated above 140/90, patient will likely need to take HCTZ 12.5 mg daily.  If still persistently high will consider increasing dose to 25 mg daily at next visit. Patient was given ER precautions for symptomatic hypertension or hypotension. Patient will follow up sooner if blood pressure is persistently elevated. They will keep a blood pressure log and bring it to their next appointment.     Patient was instructed to report to ER if symptoms become severe.    Follow up: With me in 2 weeks for blood pressure recheck      Urvashi Wood PA-C    This note was created partially with voice dictation software and is prone to errors. This note has been reviewed by me but some errors are inevitable.

## 2023-06-22 ENCOUNTER — PATIENT MESSAGE (OUTPATIENT)
Dept: FAMILY MEDICINE | Facility: CLINIC | Age: 83
End: 2023-06-22
Payer: MEDICARE

## 2023-06-22 LAB
FINAL PATHOLOGIC DIAGNOSIS: NORMAL
Lab: NORMAL

## 2023-06-23 ENCOUNTER — PATIENT MESSAGE (OUTPATIENT)
Dept: FAMILY MEDICINE | Facility: CLINIC | Age: 83
End: 2023-06-23
Payer: MEDICARE

## 2023-06-23 DIAGNOSIS — J44.9 CHRONIC OBSTRUCTIVE PULMONARY DISEASE, UNSPECIFIED COPD TYPE: Primary | Chronic | ICD-10-CM

## 2023-06-26 ENCOUNTER — PATIENT MESSAGE (OUTPATIENT)
Dept: FAMILY MEDICINE | Facility: CLINIC | Age: 83
End: 2023-06-26
Payer: MEDICARE

## 2023-06-29 ENCOUNTER — PATIENT MESSAGE (OUTPATIENT)
Dept: FAMILY MEDICINE | Facility: CLINIC | Age: 83
End: 2023-06-29
Payer: MEDICARE

## 2023-06-29 DIAGNOSIS — N30.01 ACUTE CYSTITIS WITH HEMATURIA: Primary | ICD-10-CM

## 2023-06-29 RX ORDER — SULFAMETHOXAZOLE AND TRIMETHOPRIM 800; 160 MG/1; MG/1
1 TABLET ORAL 2 TIMES DAILY
Qty: 14 TABLET | Refills: 0 | Status: SHIPPED | OUTPATIENT
Start: 2023-06-29 | End: 2023-07-06

## 2023-07-05 ENCOUNTER — LAB VISIT (OUTPATIENT)
Dept: LAB | Facility: HOSPITAL | Age: 83
End: 2023-07-05
Payer: MEDICARE

## 2023-07-05 ENCOUNTER — PATIENT MESSAGE (OUTPATIENT)
Dept: FAMILY MEDICINE | Facility: CLINIC | Age: 83
End: 2023-07-05
Payer: MEDICARE

## 2023-07-05 DIAGNOSIS — N30.01 ACUTE CYSTITIS WITH HEMATURIA: Primary | ICD-10-CM

## 2023-07-05 DIAGNOSIS — N30.01 ACUTE CYSTITIS WITH HEMATURIA: ICD-10-CM

## 2023-07-05 LAB
BILIRUB UR QL STRIP: NEGATIVE
CLARITY UR REFRACT.AUTO: CLEAR
COLOR UR AUTO: COLORLESS
GLUCOSE UR QL STRIP: NEGATIVE
HGB UR QL STRIP: NEGATIVE
KETONES UR QL STRIP: NEGATIVE
LEUKOCYTE ESTERASE UR QL STRIP: NEGATIVE
NITRITE UR QL STRIP: NEGATIVE
PH UR STRIP: 6 [PH] (ref 5–8)
PROT UR QL STRIP: NEGATIVE
SP GR UR STRIP: 1 (ref 1–1.03)
URN SPEC COLLECT METH UR: ABNORMAL

## 2023-07-05 PROCEDURE — 81003 URINALYSIS AUTO W/O SCOPE: CPT

## 2023-07-05 PROCEDURE — 87086 URINE CULTURE/COLONY COUNT: CPT

## 2023-07-05 NOTE — TELEPHONE ENCOUNTER
"Spoke with pt. Notified him of update with delay in the urinalysis results. Instructed pt repeat UA needed for orders per RUSSEL Alcantara. Pt verbalized understanding. Pt reported that he has about 1 day remaining on the abtx, and that his s/s of UTI and Hematuria has improved. Still with occasional urgency. Pt stated "I am feeling much better since taking the abtx."     Notified RUSSEL Alcantara of above pt s/s.   VORB per Urvashi Wood PA:    1) continue course of abtx  2) have pt do UA and Urine Culture.    Instructed pt of above instructions and orders. Informed him further to be advised on if additional abtx needed pending results of urine culture. Verbalized understanding. Pt scheduled with Urology on 11/3/23.   "

## 2023-07-05 NOTE — TELEPHONE ENCOUNTER
"Called [facility] lab and spoke with Dimas regarding UA and UA culture results from 6/20/23 that are still showing "in process." Dimas reported that specimen was never transported/or received from Cytology for them to complete the orders. Test never ran for UA & UA culture.   Notified RUSSEL Landin of above.   VORB per RUSSEL Alcantara:    1) Verify pt is taking the abtx sent in to pharmacy prior to holiday.   2) Have pt come in to give another urine sample for UA culture, if able to.    Attempted to contact pt. No answer. Left VM for pt to contact clinic. Sent portal message for call back.   "

## 2023-07-05 NOTE — TELEPHONE ENCOUNTER
Urinalysis and Urine culture order re-entered. VORB per RUSSEL Alcantara. Pt aware to come to clinic to recollect urine.

## 2023-07-06 ENCOUNTER — OFFICE VISIT (OUTPATIENT)
Dept: FAMILY MEDICINE | Facility: CLINIC | Age: 83
End: 2023-07-06
Payer: MEDICARE

## 2023-07-06 ENCOUNTER — PATIENT MESSAGE (OUTPATIENT)
Dept: FAMILY MEDICINE | Facility: CLINIC | Age: 83
End: 2023-07-06

## 2023-07-06 VITALS — SYSTOLIC BLOOD PRESSURE: 135 MMHG | DIASTOLIC BLOOD PRESSURE: 75 MMHG

## 2023-07-06 DIAGNOSIS — E53.8 B12 DEFICIENCY: ICD-10-CM

## 2023-07-06 DIAGNOSIS — Z78.9 USES ALCOHOL OCCASIONALLY: ICD-10-CM

## 2023-07-06 DIAGNOSIS — I10 PRIMARY HYPERTENSION: ICD-10-CM

## 2023-07-06 DIAGNOSIS — Z85.46 HISTORY OF PROSTATE CANCER: ICD-10-CM

## 2023-07-06 DIAGNOSIS — N30.01 ACUTE CYSTITIS WITH HEMATURIA: Primary | ICD-10-CM

## 2023-07-06 PROBLEM — F10.20 ALCOHOL DEPENDENCE, UNCOMPLICATED: Status: RESOLVED | Noted: 2023-06-20 | Resolved: 2023-07-06

## 2023-07-06 LAB — BACTERIA UR CULT: NO GROWTH

## 2023-07-06 PROCEDURE — 99214 OFFICE O/P EST MOD 30 MIN: CPT | Mod: 95,,,

## 2023-07-06 PROCEDURE — 99214 PR OFFICE/OUTPT VISIT, EST, LEVL IV, 30-39 MIN: ICD-10-PCS | Mod: 95,,,

## 2023-07-06 NOTE — PROGRESS NOTES
[facility]  [address]  [place] [address]    Subjective    Chief Complaint: No chief complaint on file.      History of Present Illness:     Jet Hernandez is a(n) 83 y.o. male with past medical history as noted below who presents to the clinic today for follow up.    LOV with me 6/20/2023 for gross hematuria. Urine cytology without urothelial neoplasia. Unfortunately urine culture and microscopic analysis were lost in lab, so patient left another urine specimen yesterday. Repeat urinalysis without blood or other signs of infection. Urine culture in process. I did prescribe Bactrim given the delay due to labs, which he reports has significantly relieved urgency. Reports no hematuria. He has an appointment with a urologist (external) in about 1 month.     LOV BP also significantly elevated, but reports white coat HTN. He would rather not check BP during visit today. Recent BP has been 135/75 approximately. Current regimen ramipril 10 mg BID, carvedilol 12.5 mg BID, and HCTZ 12.5 mg daily.     He would like alcohol dependence removed from chart. States he has a few drinks throughout the month, including occasional glasses of wine and a glass of scotch.      ----------------------    The patient location is: [place]    Visit type: Audiovisual    Face to Face time with patient: 15 min  20 minutes of total time spent on the encounter, which includes face to face time and non-face to face time preparing to see the patient (eg, review of tests), Obtaining and/or reviewing separately obtained history, Documenting clinical information in the electronic or other health record, Independently interpreting results (not separately reported) and communicating results to the patient/family/caregiver, or Care coordination (not separately reported).     Each patient to whom he or she provides medical services by telemedicine is:  (1) informed of the relationship between the  physician and patient and the respective role of any other health care provider with respect to management of the patient; and (2) notified that he or she may decline to receive medical services by telemedicine and may withdraw from such care at any time.      Problem List:   Patient Active Problem List   Diagnosis    Hypertension    Coronary artery disease involving native coronary artery without angina pectoris    Arthritis of lumbar spine    COPD (chronic obstructive pulmonary disease)    Gastroesophageal reflux disease without esophagitis    History of colon polyps    Elevated PSA    Hyperlipidemia    Renal insufficiency    Anxiety    Status post coronary artery stent placement    History of prostate cancer    Status post radiation therapy    Macrocytic anemia    B12 deficiency    Impaired glucose metabolism    Alcohol use    Thrombocytopenia    Encounter for laboratory test    Myalgia    Elevated CPK    Medication side effect    Alcohol dependence, uncomplicated       Current Outpatient Medications:   Current Outpatient Medications   Medication Instructions    albuterol (PROVENTIL/VENTOLIN HFA) 90 mcg/actuation inhaler INL 1 TO 2 PFS PO Q 4 TO 6 H PRF SOB OR WHZ OR CHEST TIGHTNESS    aspirin 81 mg Tab Daily    carvediloL (COREG) 12.5 mg, 2 times daily    cetirizine (ZYRTEC) 10 mg Cap Zyrtec 10 mg capsule   Take 1 capsule every day by oral route.    clopidogreL (PLAVIX) 75 mg tablet clopidogrel 75 mg tablet   Take 1 tablet every day by oral route as directed for 90 days.    cyanocobalamin (VITAMIN B-12) 1000 MCG tablet Take 1000 mcg of B12 p.o. daily; please provide generic    famotidine (PEPCID) 20 mg, Oral, Nightly PRN, or reflux. Generic please. Stop nexium/esomeprazole    fluticasone propionate (FLONASE) 50 mcg/actuation nasal spray SHAKE LIQUID AND USE 1 SPRAY(50 MCG) IN EACH NOSTRIL EVERY DAY    hydroCHLOROthiazide (MICROZIDE) 12.5 mg, Daily    ramipriL (ALTACE) 10 mg, Oral, 2 times daily    rosuvastatin  (CRESTOR) 10 mg, Oral, Daily    sildenafil (REVATIO) 20 mg Tab TAKE 3 TABLETS BY MOUTH EVERY DAY AS NEEDED AS DIRECTED    sulfamethoxazole-trimethoprim 800-160mg (BACTRIM DS) 800-160 mg Tab 1 tablet, Oral, 2 times daily       Surgical History:   Past Surgical History:   Procedure Laterality Date    CARPAL TUNNEL RELEASE  February 2012    right wrist    CHOLECYSTECTOMY  11/2007    CORONARY ANGIOPLASTY  August 2000    circumflex    CORONARY STENT PLACEMENT  July 2000    EYE SURGERY  November 2012    cataract right eye    HERNIA REPAIR      right inguinal    SPINE SURGERY  May 2005    spinal laminectomy and fusion       Family History: No family history on file.    Allergies:   Review of patient's allergies indicates:   Allergen Reactions    No known drug allergies        Tobacco Status:   Tobacco Use: Medium Risk    Smoking Tobacco Use: Former    Smokeless Tobacco Use: Never    Passive Exposure: Not on file       Sexual Activity:   Social History     Substance and Sexual Activity   Sexual Activity Not on file       Alcohol Use:   Social History     Substance and Sexual Activity   Alcohol Use Yes    Comment: 4 times per week         Objective       Vitals:    07/06/23 1024   BP: 135/75  Comment: average of home values recently       Review of Systems   Constitutional:  Positive for weight loss (no recent weight loss; weight stable when comparing previous 2 office visits). Negative for chills.   Gastrointestinal:  Negative for constipation, nausea and vomiting.   Genitourinary:  Positive for dysuria (improved), frequency (improved) and hematuria (improved). Negative for flank pain.   Skin:  Negative for rash.     Physical Exam  Constitutional:       Appearance: He is well-developed.   HENT:      Head: Normocephalic and atraumatic.   Pulmonary:      Effort: Pulmonary effort is normal. No respiratory distress.   Musculoskeletal:      Cervical back: Normal range of motion.   Psychiatric:         Behavior: Behavior normal.          Thought Content: Thought content normal.         Judgment: Judgment normal.         Assessment and Plan:    1. Acute cystitis with hematuria    2. Uses alcohol occasionally    3. History of prostate cancer    4. B12 deficiency    5. Primary hypertension  Overview:  Followed by Dr. Jason Lott.           Visit summary:    Jet Hernandez presented today for follow up.    UTI symptoms improving with Bactrim, repeat UA without blood. Pending urine culture results     Updated problem list as patient does not meet criteria for alcohol dependence    Would like pt to f/u with his urologist, has visit in 1 month     We did review recent labs which show macrocytic anemia, he will resume B12 supplementation. Repeat labs in coming months.     HTN well controlled with recent values, continue current regimen    Patient was instructed to report to ER if symptoms become severe.    Follow up: to est care with Dr. Devante Wood PA-C    This note was created partially with voice dictation software and is prone to errors. This note has been reviewed by me but some errors are inevitable.

## 2023-07-21 ENCOUNTER — PATIENT MESSAGE (OUTPATIENT)
Dept: FAMILY MEDICINE | Facility: CLINIC | Age: 83
End: 2023-07-21
Payer: MEDICARE

## 2023-07-26 ENCOUNTER — PATIENT MESSAGE (OUTPATIENT)
Dept: FAMILY MEDICINE | Facility: CLINIC | Age: 83
End: 2023-07-26
Payer: MEDICARE

## 2023-08-05 ENCOUNTER — TELEPHONE (OUTPATIENT)
Dept: FAMILY MEDICINE | Facility: CLINIC | Age: 83
End: 2023-08-05
Payer: MEDICARE

## 2023-08-05 NOTE — TELEPHONE ENCOUNTER
----- Message from Nita Griffin sent at 7/31/2023  2:22 PM CDT -----  Regarding: appointment  Contact: Marilee spouse  Type:  Sooner Appointment Request    Caller is requesting a sooner appointment.  Caller declined first available appointment listed below.  Caller will not accept being placed on the waitlist and is requesting a message be sent to doctor.    Name of Caller:  Marilee spouse  When is the first available appointment?    Symptoms:  covid 5th booster  Best Call Back Number:  [phone] (home) [phone] (work)    Additional Information:  Please call patient spouse to schedule.  Thanks!

## 2023-08-23 ENCOUNTER — IMMUNIZATION (OUTPATIENT)
Dept: FAMILY MEDICINE | Facility: CLINIC | Age: 83
End: 2023-08-23
Payer: MEDICARE

## 2023-08-23 DIAGNOSIS — Z23 NEED FOR VACCINATION: Primary | ICD-10-CM

## 2023-08-23 PROCEDURE — 0124A COVID-19, MRNA, LNP-S, BIVALENT BOOSTER, PF, 30 MCG/0.3 ML DOSE: CPT | Mod: PBBFAC,PO

## 2023-08-23 PROCEDURE — 99999PBSHW COVID-19, MRNA, LNP-S, BIVALENT BOOSTER, PF, 30 MCG/0.3 ML DOSE: Mod: PBBFAC,,,

## 2023-08-23 PROCEDURE — 99999PBSHW COVID-19, MRNA, LNP-S, BIVALENT BOOSTER, PF, 30 MCG/0.3 ML DOSE: ICD-10-PCS | Mod: PBBFAC,,,

## 2023-08-25 ENCOUNTER — PATIENT MESSAGE (OUTPATIENT)
Dept: FAMILY MEDICINE | Facility: CLINIC | Age: 83
End: 2023-08-25
Payer: MEDICARE

## 2023-09-11 ENCOUNTER — LAB VISIT (OUTPATIENT)
Dept: LAB | Facility: HOSPITAL | Age: 83
End: 2023-09-11
Attending: INTERNAL MEDICINE
Payer: MEDICARE

## 2023-09-11 DIAGNOSIS — D69.6 THROMBOCYTOPENIA: ICD-10-CM

## 2023-09-11 DIAGNOSIS — R35.1 NOCTURIA: ICD-10-CM

## 2023-09-11 DIAGNOSIS — R39.15 URGENCY OF URINATION: ICD-10-CM

## 2023-09-11 DIAGNOSIS — E78.00 PURE HYPERCHOLESTEROLEMIA: ICD-10-CM

## 2023-09-11 DIAGNOSIS — R74.8 ELEVATED CPK: ICD-10-CM

## 2023-09-11 DIAGNOSIS — D53.9 MACROCYTIC ANEMIA: ICD-10-CM

## 2023-09-11 DIAGNOSIS — C61 MALIGNANT NEOPLASM OF PROSTATE: Primary | ICD-10-CM

## 2023-09-11 DIAGNOSIS — M79.10 MYALGIA: ICD-10-CM

## 2023-09-11 DIAGNOSIS — R35.0 URINARY FREQUENCY: ICD-10-CM

## 2023-09-11 LAB
ALBUMIN SERPL BCP-MCNC: 4.1 G/DL (ref 3.5–5.2)
ALP SERPL-CCNC: 59 U/L (ref 55–135)
ALT SERPL W/O P-5'-P-CCNC: 30 U/L (ref 10–44)
ANION GAP SERPL CALC-SCNC: 11 MMOL/L (ref 8–16)
AST SERPL-CCNC: 31 U/L (ref 10–40)
BASOPHILS # BLD AUTO: 0.04 K/UL (ref 0–0.2)
BASOPHILS NFR BLD: 0.7 % (ref 0–1.9)
BILIRUB SERPL-MCNC: 1.1 MG/DL (ref 0.1–1)
BUN SERPL-MCNC: 22 MG/DL (ref 8–23)
CALCIUM SERPL-MCNC: 9.3 MG/DL (ref 8.7–10.5)
CHLORIDE SERPL-SCNC: 110 MMOL/L (ref 95–110)
CHOLEST SERPL-MCNC: 119 MG/DL (ref 120–199)
CHOLEST/HDLC SERPL: 2.8 {RATIO} (ref 2–5)
CK SERPL-CCNC: 1341 U/L (ref 20–200)
CO2 SERPL-SCNC: 23 MMOL/L (ref 23–29)
COMPLEXED PSA SERPL-MCNC: 0.08 NG/ML (ref 0–4)
CREAT SERPL-MCNC: 1.1 MG/DL (ref 0.5–1.4)
DIFFERENTIAL METHOD: ABNORMAL
EOSINOPHIL # BLD AUTO: 0.2 K/UL (ref 0–0.5)
EOSINOPHIL NFR BLD: 4 % (ref 0–8)
ERYTHROCYTE [DISTWIDTH] IN BLOOD BY AUTOMATED COUNT: 12.6 % (ref 11.5–14.5)
EST. GFR  (NO RACE VARIABLE): >60 ML/MIN/1.73 M^2
FOLATE SERPL-MCNC: 10.5 NG/ML (ref 4–24)
GLUCOSE SERPL-MCNC: 116 MG/DL (ref 70–110)
HCT VFR BLD AUTO: 37.1 % (ref 40–54)
HDLC SERPL-MCNC: 43 MG/DL (ref 40–75)
HDLC SERPL: 36.1 % (ref 20–50)
HGB BLD-MCNC: 13 G/DL (ref 14–18)
IMM GRANULOCYTES # BLD AUTO: 0.01 K/UL (ref 0–0.04)
IMM GRANULOCYTES NFR BLD AUTO: 0.2 % (ref 0–0.5)
LDLC SERPL CALC-MCNC: 51.4 MG/DL (ref 63–159)
LYMPHOCYTES # BLD AUTO: 1.2 K/UL (ref 1–4.8)
LYMPHOCYTES NFR BLD: 20.9 % (ref 18–48)
MCH RBC QN AUTO: 32.5 PG (ref 27–31)
MCHC RBC AUTO-ENTMCNC: 35 G/DL (ref 32–36)
MCV RBC AUTO: 93 FL (ref 82–98)
MONOCYTES # BLD AUTO: 0.6 K/UL (ref 0.3–1)
MONOCYTES NFR BLD: 10.8 % (ref 4–15)
NEUTROPHILS # BLD AUTO: 3.6 K/UL (ref 1.8–7.7)
NEUTROPHILS NFR BLD: 63.4 % (ref 38–73)
NONHDLC SERPL-MCNC: 76 MG/DL
NRBC BLD-RTO: 0 /100 WBC
PLATELET # BLD AUTO: 154 K/UL (ref 150–450)
PMV BLD AUTO: 12 FL (ref 9.2–12.9)
POTASSIUM SERPL-SCNC: 4.6 MMOL/L (ref 3.5–5.1)
PROT SERPL-MCNC: 6.9 G/DL (ref 6–8.4)
RBC # BLD AUTO: 4 M/UL (ref 4.6–6.2)
SODIUM SERPL-SCNC: 144 MMOL/L (ref 136–145)
TRIGL SERPL-MCNC: 123 MG/DL (ref 30–150)
TSH SERPL DL<=0.005 MIU/L-ACNC: 2.82 UIU/ML (ref 0.4–4)
VIT B12 SERPL-MCNC: 479 PG/ML (ref 210–950)
WBC # BLD AUTO: 5.74 K/UL (ref 3.9–12.7)

## 2023-09-11 PROCEDURE — 36415 COLL VENOUS BLD VENIPUNCTURE: CPT | Mod: PN | Performed by: INTERNAL MEDICINE

## 2023-09-11 PROCEDURE — 84153 ASSAY OF PSA TOTAL: CPT | Performed by: SPECIALIST

## 2023-09-11 PROCEDURE — 84443 ASSAY THYROID STIM HORMONE: CPT | Performed by: INTERNAL MEDICINE

## 2023-09-11 PROCEDURE — 80061 LIPID PANEL: CPT | Performed by: INTERNAL MEDICINE

## 2023-09-11 PROCEDURE — 82607 VITAMIN B-12: CPT | Performed by: INTERNAL MEDICINE

## 2023-09-11 PROCEDURE — 82550 ASSAY OF CK (CPK): CPT | Performed by: INTERNAL MEDICINE

## 2023-09-11 PROCEDURE — 85025 COMPLETE CBC W/AUTO DIFF WBC: CPT | Performed by: INTERNAL MEDICINE

## 2023-09-11 PROCEDURE — 82746 ASSAY OF FOLIC ACID SERUM: CPT | Performed by: INTERNAL MEDICINE

## 2023-09-11 PROCEDURE — 80053 COMPREHEN METABOLIC PANEL: CPT | Performed by: INTERNAL MEDICINE

## 2023-09-11 PROCEDURE — 36415 COLL VENOUS BLD VENIPUNCTURE: CPT | Mod: PN | Performed by: SPECIALIST

## 2023-09-13 ENCOUNTER — PATIENT MESSAGE (OUTPATIENT)
Dept: FAMILY MEDICINE | Facility: CLINIC | Age: 83
End: 2023-09-13
Payer: MEDICARE

## 2023-09-16 ENCOUNTER — PATIENT MESSAGE (OUTPATIENT)
Dept: FAMILY MEDICINE | Facility: CLINIC | Age: 83
End: 2023-09-16
Payer: MEDICARE

## 2023-09-16 ENCOUNTER — TELEPHONE (OUTPATIENT)
Dept: FAMILY MEDICINE | Facility: CLINIC | Age: 83
End: 2023-09-16
Payer: MEDICARE

## 2023-09-25 ENCOUNTER — OFFICE VISIT (OUTPATIENT)
Dept: FAMILY MEDICINE | Facility: CLINIC | Age: 83
End: 2023-09-25
Payer: MEDICARE

## 2023-09-25 DIAGNOSIS — I10 PRIMARY HYPERTENSION: Primary | ICD-10-CM

## 2023-09-25 DIAGNOSIS — E78.00 PURE HYPERCHOLESTEROLEMIA: ICD-10-CM

## 2023-09-25 DIAGNOSIS — D64.9 NORMOCYTIC ANEMIA: ICD-10-CM

## 2023-09-25 DIAGNOSIS — R74.8 ELEVATED CPK: ICD-10-CM

## 2023-09-25 DIAGNOSIS — E53.8 B12 DEFICIENCY: ICD-10-CM

## 2023-09-25 PROCEDURE — 99214 PR OFFICE/OUTPT VISIT, EST, LEVL IV, 30-39 MIN: ICD-10-PCS | Mod: 95,,,

## 2023-09-25 PROCEDURE — 99214 OFFICE O/P EST MOD 30 MIN: CPT | Mod: 95,,,

## 2023-09-25 NOTE — Clinical Note
Hi,   This patient plans to establish care with you. He is requesting vitamin D level, would you mind ordering this lab? I still can't order with the lab shortage.   Thank you!

## 2023-09-25 NOTE — PROGRESS NOTES
[facility]  [address]  [place] [id number]    Subjective    Chief Complaint: Follow up    History of Present Illness:     Jet Hernandez is a(n) 83 y.o. male with past medical history as noted below who presents via virtual visit today for follow up.    Reviewed labs today, see below.    Hypertension  Current regimen includes ramipril 10 mg daily and HCTZ 12.5. Reports daily numbers are controlled. Tries to limit salt.    CPK elevated  This has been an issue previously with simvastatin, was changed to rosuvastatin. No muscle aches presently.     Interested in RSV vaccine      Component      Latest Ref Rng 9/11/2023   WBC      3.90 - 12.70 K/uL 5.74    RBC      4.60 - 6.20 M/uL 4.00 (L)    Hemoglobin      14.0 - 18.0 g/dL 13.0 (L)    Hematocrit      40.0 - 54.0 % 37.1 (L)    MCV      82 - 98 fL 93    MCH      27.0 - 31.0 pg 32.5 (H)    MCHC      32.0 - 36.0 g/dL 35.0    RDW      11.5 - 14.5 % 12.6    Platelets      150 - 450 K/uL 154    MPV      9.2 - 12.9 fL 12.0    Immature Granulocytes      0.0 - 0.5 % 0.2    Gran # (ANC)      1.8 - 7.7 K/uL 3.6    Immature Grans (Abs)      0.00 - 0.04 K/uL 0.01    Lymph #      1.0 - 4.8 K/uL 1.2    Mono #      0.3 - 1.0 K/uL 0.6    Eos #      0.0 - 0.5 K/uL 0.2    Baso #      0.00 - 0.20 K/uL 0.04    nRBC      0 /100 WBC 0    Gran %      38.0 - 73.0 % 63.4    Lymph %      18.0 - 48.0 % 20.9    Mono %      4.0 - 15.0 % 10.8    Eosinophil %      0.0 - 8.0 % 4.0    Basophil %      0.0 - 1.9 % 0.7    Differential Method Automated    Sodium      136 - 145 mmol/L 144    Potassium      3.5 - 5.1 mmol/L 4.6    Chloride      95 - 110 mmol/L 110    CO2      23 - 29 mmol/L 23    Glucose      70 - 110 mg/dL 116 (H)    BUN      8 - 23 mg/dL 22    Creatinine      0.5 - 1.4 mg/dL 1.1    Calcium      8.7 - 10.5 mg/dL 9.3    PROTEIN TOTAL      6.0 - 8.4 g/dL 6.9    Albumin      3.5 - 5.2 g/dL 4.1    BILIRUBIN TOTAL      0.1 - 1.0 mg/dL  1.1 (H)    Alkaline Phosphatase      55 - 135 U/L 59    AST      10 - 40 U/L 31    ALT      10 - 44 U/L 30    eGFR      >60 mL/min/1.73 m^2 >60.0    Anion Gap      8 - 16 mmol/L 11    Cholesterol      120 - 199 mg/dL 119 (L)    Triglycerides      30 - 150 mg/dL 123    HDL      40 - 75 mg/dL 43    LDL Cholesterol External      63.0 - 159.0 mg/dL 51.4 (L)    HDL/Cholesterol Ratio      20.0 - 50.0 % 36.1    Total Cholesterol/HDL Ratio      2.0 - 5.0  2.8    Non-HDL Cholesterol      mg/dL 76    CPK      20 - 200 U/L 1341 (H)    TSH      0.400 - 4.000 uIU/mL 2.817    Vitamin B-12      210 - 950 pg/mL 479    Folate      4.0 - 24.0 ng/mL 10.5    PSA, Screen      0.00 - 4.00 ng/mL 0.08       Legend:  (L) Low  (H) High   ---------------    The patient location is: [place]    Visit type: Audiovisual    Face to Face time with patient: 15 min  20 minutes of total time spent on the encounter, which includes face to face time and non-face to face time preparing to see the patient (eg, review of tests), Obtaining and/or reviewing separately obtained history, Documenting clinical information in the electronic or other health record, Independently interpreting results (not separately reported) and communicating results to the patient/family/caregiver, or Care coordination (not separately reported).     Each patient to whom he or she provides medical services by telemedicine is:  (1) informed of the relationship between the physician and patient and the respective role of any other health care provider with respect to management of the patient; and (2) notified that he or she may decline to receive medical services by telemedicine and may withdraw from such care at any time.      Problem List:   Patient Active Problem List   Diagnosis    Hypertension    Coronary artery disease involving native coronary artery without angina pectoris    Arthritis of lumbar spine    COPD (chronic obstructive pulmonary disease)    Gastroesophageal reflux  disease without esophagitis    History of colon polyps    Elevated PSA    Hyperlipidemia    Renal insufficiency    Anxiety    Status post coronary artery stent placement    History of prostate cancer    Status post radiation therapy    Macrocytic anemia    B12 deficiency    Impaired glucose metabolism    Uses alcohol occasionally    Thrombocytopenia    Encounter for laboratory test    Myalgia    Elevated CPK    Medication side effect       Current Outpatient Medications:   Current Outpatient Medications   Medication Instructions    albuterol (PROVENTIL/VENTOLIN HFA) 90 mcg/actuation inhaler INL 1 TO 2 PFS PO Q 4 TO 6 H PRF SOB OR WHZ OR CHEST TIGHTNESS    aspirin 81 mg Tab Daily    carvediloL (COREG) 12.5 mg, 2 times daily    cetirizine (ZYRTEC) 10 mg Cap Zyrtec 10 mg capsule   Take 1 capsule every day by oral route.    clopidogreL (PLAVIX) 75 mg tablet clopidogrel 75 mg tablet   Take 1 tablet every day by oral route as directed for 90 days.    cyanocobalamin (VITAMIN B-12) 1000 MCG tablet Take 1000 mcg of B12 p.o. daily; please provide generic    famotidine (PEPCID) 20 mg, Oral, Nightly PRN, or reflux. Generic please. Stop nexium/esomeprazole    fluticasone propionate (FLONASE) 50 mcg/actuation nasal spray SHAKE LIQUID AND USE 1 SPRAY(50 MCG) IN EACH NOSTRIL EVERY DAY    hydroCHLOROthiazide (MICROZIDE) 12.5 mg, Daily    ramipriL (ALTACE) 10 mg, Oral, 2 times daily    rosuvastatin (CRESTOR) 10 MG tablet TAKE 1 TABLET(10 MG) BY MOUTH EVERY DAY    sildenafil (REVATIO) 20 mg Tab TAKE 3 TABLETS BY MOUTH EVERY DAY AS NEEDED AS DIRECTED       Surgical History:   Past Surgical History:   Procedure Laterality Date    CARPAL TUNNEL RELEASE  February 2012    right wrist    CHOLECYSTECTOMY  11/2007    CORONARY ANGIOPLASTY  August 2000    circumflex    CORONARY STENT PLACEMENT  July 2000    EYE SURGERY  November 2012    cataract right eye    HERNIA REPAIR      right inguinal    SPINE SURGERY  May 2005    spinal laminectomy and  fusion       Family History: No family history on file.    Allergies:   Review of patient's allergies indicates:   Allergen Reactions    No known drug allergies        Tobacco Status:   Tobacco Use: Medium Risk (6/20/2023)    Patient History     Smoking Tobacco Use: Former     Smokeless Tobacco Use: Never     Passive Exposure: Not on file       Sexual Activity:   Social History     Substance and Sexual Activity   Sexual Activity Not on file       Alcohol Use:   Social History     Substance and Sexual Activity   Alcohol Use Yes    Comment: 4 times per week         Objective       There were no vitals filed for this visit.    Review of Systems   HENT:  Negative for hearing loss.    Eyes:  Negative for discharge.   Respiratory:  Negative for wheezing.    Cardiovascular:  Negative for chest pain and palpitations.   Gastrointestinal:  Negative for blood in stool, constipation, diarrhea and vomiting.   Genitourinary:  Negative for hematuria and urgency.   Musculoskeletal:  Negative for neck pain.   Neurological:  Negative for weakness (reported on questionnaire, not reported during visit) and headaches.   Endo/Heme/Allergies:  Negative for polydipsia.       Physical Exam  Constitutional:       Appearance: He is well-developed.   HENT:      Head: Normocephalic and atraumatic.   Pulmonary:      Effort: Pulmonary effort is normal. No respiratory distress.   Musculoskeletal:      Cervical back: Normal range of motion.   Psychiatric:         Behavior: Behavior normal.         Thought Content: Thought content normal.         Judgment: Judgment normal.         Assessment and Plan:    1. Normocytic anemia  -     Iron and TIBC; Future; Expected date: 09/25/2023  -     Ferritin; Future; Expected date: 09/25/2023    2. Primary hypertension  Overview:  Followed by Dr. Jason Lott.       3. Elevated CPK    4. Pure hypercholesterolemia    5. B12 deficiency        Visit summary:    Jet Hernandez presented today for follow  up.    Hypertension controlled, continue current regimen.    CPK is elevated-- I suspect d/t statin use. Kidney function ok. I recommend changing statin or to a different lipid-lowering drug, such as ezetimibe. He prefers to discuss with cardiology instead.    Normocytic anemia-- has hx B12 deficiency. Ordered iron studies, will also order vitamin D level with history of deficiency    Discussed RSV vaccine, which I recommend for Mr. Hernandez     Patient was instructed to report to ER if symptoms become severe.    Follow up: to est with new PCP    Urvashi Wood PA-C    This note was created partially with voice dictation software and is prone to errors. This note has been reviewed by me but some errors are inevitable.

## 2023-09-26 ENCOUNTER — PATIENT MESSAGE (OUTPATIENT)
Dept: FAMILY MEDICINE | Facility: CLINIC | Age: 83
End: 2023-09-26
Payer: MEDICARE

## 2023-10-16 ENCOUNTER — OFFICE VISIT (OUTPATIENT)
Dept: OTOLARYNGOLOGY | Facility: CLINIC | Age: 83
End: 2023-10-16
Payer: MEDICARE

## 2023-10-16 VITALS — WEIGHT: 200.19 LBS | BODY MASS INDEX: 24.89 KG/M2 | HEIGHT: 75 IN

## 2023-10-16 DIAGNOSIS — H61.23 BILATERAL IMPACTED CERUMEN: ICD-10-CM

## 2023-10-16 DIAGNOSIS — Z97.4 WEARS HEARING AID IN BOTH EARS: Primary | ICD-10-CM

## 2023-10-16 PROCEDURE — 69210 PR REMOVAL IMPACTED CERUMEN REQUIRING INSTRUMENTATION, UNILATERAL: ICD-10-PCS | Mod: S$PBB,,, | Performed by: NURSE PRACTITIONER

## 2023-10-16 PROCEDURE — 99999 PR PBB SHADOW E&M-EST. PATIENT-LVL III: ICD-10-PCS | Mod: PBBFAC,,, | Performed by: NURSE PRACTITIONER

## 2023-10-16 PROCEDURE — 99499 UNLISTED E&M SERVICE: CPT | Mod: S$PBB,,, | Performed by: NURSE PRACTITIONER

## 2023-10-16 PROCEDURE — 69210 REMOVE IMPACTED EAR WAX UNI: CPT | Mod: PBBFAC,PO | Performed by: NURSE PRACTITIONER

## 2023-10-16 PROCEDURE — 99999 PR PBB SHADOW E&M-EST. PATIENT-LVL III: CPT | Mod: PBBFAC,,, | Performed by: NURSE PRACTITIONER

## 2023-10-16 PROCEDURE — 99499 NO LOS: ICD-10-PCS | Mod: S$PBB,,, | Performed by: NURSE PRACTITIONER

## 2023-10-16 PROCEDURE — 69210 REMOVE IMPACTED EAR WAX UNI: CPT | Mod: S$PBB,,, | Performed by: NURSE PRACTITIONER

## 2023-10-16 PROCEDURE — 99213 OFFICE O/P EST LOW 20 MIN: CPT | Mod: PBBFAC,PO | Performed by: NURSE PRACTITIONER

## 2023-10-16 NOTE — PROGRESS NOTES
Subjective:       Patient ID: Jet Hernandez is a 83 y.o. male.    Chief Complaint: Cerumen Impaction    HPI  Patient last seen by me approx 9 months ago. He wears hearing aids through Dr. Jennings at [facility] in [place]. He would like cerumen removed to improve his hearing aid use. He denies otalgia or otorrhea.  He denies any other ENT symptoms or concerns at present.     Review of Systems   Constitutional: Negative.  Negative for fatigue and fever.   HENT:  Positive for hearing loss (wears hearing aids AU). Negative for ear discharge.    Eyes: Negative.    Respiratory: Negative.  Negative for cough, shortness of breath and wheezing.    Cardiovascular: Negative.    Gastrointestinal: Negative.    Endocrine: Negative.    Genitourinary: Negative.    Musculoskeletal: Negative.  Negative for neck pain.   Skin: Negative.  Negative for pallor and rash.   Neurological: Negative.    Hematological:  Bruises/bleeds easily.   Psychiatric/Behavioral: Negative.         Objective:      Physical Exam  Vitals and nursing note reviewed.   Constitutional:       General: He is not in acute distress.     Appearance: He is well-developed. He is not ill-appearing or diaphoretic.   HENT:      Head: Normocephalic and atraumatic.      Right Ear: Tympanic membrane, ear canal and external ear normal. No drainage. No middle ear effusion. Tympanic membrane is not erythematous.      Left Ear: Tympanic membrane, ear canal and external ear normal. No drainage.  No middle ear effusion. Tympanic membrane is not erythematous.   Eyes:      General: No scleral icterus.        Right eye: No discharge.         Left eye: No discharge.   Neck:      Trachea: Trachea normal. No tracheal deviation.   Pulmonary:      Effort: Pulmonary effort is normal. No respiratory distress.      Breath sounds: No wheezing.   Musculoskeletal:         General: Normal range of motion.      Cervical back: Normal range of motion and neck supple.   Skin:      General: Skin is warm and dry.      Coloration: Skin is not pale.      Findings: No erythema, lesion or rash.   Neurological:      Mental Status: He is alert and oriented to person, place, and time.   Psychiatric:         Speech: Speech normal.         Behavior: Behavior normal. Behavior is cooperative.         Thought Content: Thought content normal.         Judgment: Judgment normal.      SEPARATE PROCEDURE IN OFFICE:   Procedure: Removal of impacted cerumen, bilateral   Pre Procedure Diagnosis: Cerumen Impaction   Post Procedure Diagnosis: Cerumen Impaction   Verbal informed consent in regards to risk of trauma to ear canal, ear drum or hearing, discomfort during procedure and/or inability to remove cerumen impaction in one session or unforeseen events or complications.   No anesthesia.     Procedure in detail:   Ear canal visualized bilateral with appropriate size ear speculum utilizing Operating Head Binocular Otomicroscope   Utilizing the following:  Ring curet was used AU. The impacted cerumen of the ear canals was removed atraumatically. The TM and EAC were then inspected and found to be clear of wax. See description of TMs/EACs in PE above.   Complications: No   Condition: Improved/Good    Assessment:     Cerumen impactions removed    Hearing loss, wears hearing aids AU  Plan:      Patient states he would like to have his ears cleaned every 6 months.      Return as needed for further ENT concerns

## 2024-01-09 ENCOUNTER — PATIENT MESSAGE (OUTPATIENT)
Dept: FAMILY MEDICINE | Facility: CLINIC | Age: 84
End: 2024-01-09
Payer: MEDICARE

## 2024-01-09 DIAGNOSIS — U07.1 COVID-19 VIRUS DETECTED: ICD-10-CM

## 2024-01-11 ENCOUNTER — PATIENT MESSAGE (OUTPATIENT)
Dept: RESEARCH | Facility: HOSPITAL | Age: 84
End: 2024-01-11
Payer: MEDICARE

## 2024-04-22 ENCOUNTER — OFFICE VISIT (OUTPATIENT)
Dept: OTOLARYNGOLOGY | Facility: CLINIC | Age: 84
End: 2024-04-22
Payer: MEDICARE

## 2024-04-22 VITALS — HEIGHT: 75 IN | BODY MASS INDEX: 25.52 KG/M2 | WEIGHT: 205.25 LBS

## 2024-04-22 DIAGNOSIS — H61.23 BILATERAL IMPACTED CERUMEN: ICD-10-CM

## 2024-04-22 DIAGNOSIS — H69.93 ETD (EUSTACHIAN TUBE DYSFUNCTION), BILATERAL: Primary | ICD-10-CM

## 2024-04-22 DIAGNOSIS — Z97.4 WEARS HEARING AID IN BOTH EARS: ICD-10-CM

## 2024-04-22 PROCEDURE — 69210 REMOVE IMPACTED EAR WAX UNI: CPT | Mod: PBBFAC,PO | Performed by: NURSE PRACTITIONER

## 2024-04-22 PROCEDURE — 69210 REMOVE IMPACTED EAR WAX UNI: CPT | Mod: S$PBB,,, | Performed by: NURSE PRACTITIONER

## 2024-04-22 PROCEDURE — 99999 PR PBB SHADOW E&M-EST. PATIENT-LVL III: CPT | Mod: PBBFAC,,, | Performed by: NURSE PRACTITIONER

## 2024-04-22 PROCEDURE — 99213 OFFICE O/P EST LOW 20 MIN: CPT | Mod: PBBFAC,PO | Performed by: NURSE PRACTITIONER

## 2024-04-22 PROCEDURE — 99213 OFFICE O/P EST LOW 20 MIN: CPT | Mod: 25,S$PBB,, | Performed by: NURSE PRACTITIONER

## 2024-04-22 RX ORDER — AZELASTINE 1 MG/ML
1 SPRAY, METERED NASAL 2 TIMES DAILY
Qty: 30 ML | Refills: 12 | Status: SHIPPED | OUTPATIENT
Start: 2024-04-22 | End: 2025-04-22

## 2024-04-22 NOTE — PROGRESS NOTES
Subjective:       Patient ID: Jet Hernandez is a 84 y.o. male.    Chief Complaint: Cerumen Impaction    HPI  Patient last seen by me approx 7 months ago. He wears hearing aids through Dr. Jennings at [facility] in [place]. He would like cerumen removed to improve his hearing aid use. He denies otalgia or otorrhea.  He states he tends to develop middle ear fluid particularly during allergy season. He uses Fluticasone nasal spray but would like to know if there is a better alternative or other recommendations.     Review of Systems   Constitutional: Negative.  Negative for fatigue and fever.   HENT:  Positive for hearing loss (wears hearing aids AU). Negative for ear discharge.    Eyes: Negative.    Respiratory: Negative.  Negative for cough, shortness of breath and wheezing.    Cardiovascular: Negative.    Gastrointestinal: Negative.    Endocrine: Negative.    Genitourinary: Negative.    Musculoskeletal: Negative.  Negative for neck pain.   Skin: Negative.  Negative for pallor and rash.   Neurological: Negative.    Hematological:  Bruises/bleeds easily.   Psychiatric/Behavioral: Negative.         Objective:      Physical Exam  Vitals and nursing note reviewed.   Constitutional:       General: He is not in acute distress.     Appearance: He is well-developed. He is not ill-appearing or diaphoretic.   HENT:      Head: Normocephalic and atraumatic.      Right Ear: Tympanic membrane, ear canal and external ear normal. No drainage. No middle ear effusion. Tympanic membrane is not erythematous.      Left Ear: Tympanic membrane, ear canal and external ear normal. No drainage.  No middle ear effusion. Tympanic membrane is not erythematous.   Eyes:      General: No scleral icterus.        Right eye: No discharge.         Left eye: No discharge.   Neck:      Trachea: Trachea normal. No tracheal deviation.   Pulmonary:      Effort: Pulmonary effort is normal. No respiratory distress.      Breath sounds: No  wheezing.   Musculoskeletal:         General: Normal range of motion.      Cervical back: Normal range of motion and neck supple.   Skin:     General: Skin is warm and dry.      Coloration: Skin is not pale.      Findings: No erythema, lesion or rash.   Neurological:      Mental Status: He is alert and oriented to person, place, and time.   Psychiatric:         Speech: Speech normal.         Behavior: Behavior normal. Behavior is cooperative.         Thought Content: Thought content normal.         Judgment: Judgment normal.      SEPARATE PROCEDURE IN OFFICE:   Procedure: Removal of impacted cerumen, bilateral   Pre Procedure Diagnosis: Cerumen Impaction   Post Procedure Diagnosis: Cerumen Impaction   Verbal informed consent in regards to risk of trauma to ear canal, ear drum or hearing, discomfort during procedure and/or inability to remove cerumen impaction in one session or unforeseen events or complications.   No anesthesia.     Procedure in detail:   Ear canal visualized bilateral with appropriate size ear speculum utilizing Operating Head Binocular Otomicroscope   Utilizing the following:  Ring curet was used AU. The impacted cerumen of the ear canals was removed atraumatically. The TM and EAC were then inspected and found to be clear of wax. See description of TMs/EACs in PE above.   Complications: No   Condition: Improved/Good    Assessment:     Cerumen impactions removed    Hearing loss, wears hearing aids AU  ETD bilateral  Plan:     Patient states he would like to have his ears cleaned every 6 months.      Hold Flonase and start trial of Astelin through allergy season to help with ETD symptoms  Return as needed for further ENT concerns